# Patient Record
Sex: MALE | Race: WHITE | Employment: FULL TIME | URBAN - METROPOLITAN AREA
[De-identification: names, ages, dates, MRNs, and addresses within clinical notes are randomized per-mention and may not be internally consistent; named-entity substitution may affect disease eponyms.]

---

## 2017-04-04 LAB
CHOLESTEROL, TOTAL: 218 MG/DL
CHOLESTEROL/HDL RATIO: 6.2
HDLC SERPL-MCNC: 35 MG/DL (ref 35–70)
LDL CHOLESTEROL CALCULATED: NORMAL MG/DL (ref 0–160)
TRIGL SERPL-MCNC: 615 MG/DL
VLDLC SERPL CALC-MCNC: NORMAL MG/DL

## 2019-04-04 LAB
AVERAGE GLUCOSE: NORMAL
CREATININE: 0.7 MG/DL
HBA1C MFR BLD: 13.3 %
POTASSIUM (K+): 3.9
PROSTATE SPECIFIC ANTIGEN: 0.6 NG/ML

## 2019-04-23 VITALS
WEIGHT: 210 LBS | DIASTOLIC BLOOD PRESSURE: 82 MMHG | HEIGHT: 70 IN | BODY MASS INDEX: 30.06 KG/M2 | HEART RATE: 76 BPM | SYSTOLIC BLOOD PRESSURE: 122 MMHG | OXYGEN SATURATION: 98 %

## 2019-04-23 RX ORDER — METFORMIN HYDROCHLORIDE 500 MG/1
500 TABLET, EXTENDED RELEASE ORAL 2 TIMES DAILY
COMMUNITY
End: 2020-01-08 | Stop reason: SDUPTHER

## 2019-04-23 RX ORDER — GLIMEPIRIDE 4 MG/1
4 TABLET ORAL 2 TIMES DAILY
COMMUNITY
End: 2020-01-08 | Stop reason: SDUPTHER

## 2019-08-30 ENCOUNTER — APPOINTMENT (OUTPATIENT)
Dept: CT IMAGING | Age: 59
End: 2019-08-30
Payer: COMMERCIAL

## 2019-08-30 ENCOUNTER — HOSPITAL ENCOUNTER (EMERGENCY)
Age: 59
Discharge: HOME | End: 2019-08-30
Payer: COMMERCIAL

## 2019-08-30 ENCOUNTER — APPOINTMENT (OUTPATIENT)
Dept: GENERAL RADIOLOGY | Age: 59
End: 2019-08-30
Payer: COMMERCIAL

## 2019-08-30 VITALS
WEIGHT: 206 LBS | HEART RATE: 75 BPM | SYSTOLIC BLOOD PRESSURE: 113 MMHG | DIASTOLIC BLOOD PRESSURE: 79 MMHG | OXYGEN SATURATION: 96 % | TEMPERATURE: 97.6 F | BODY MASS INDEX: 29.49 KG/M2 | HEIGHT: 70 IN | RESPIRATION RATE: 16 BRPM

## 2019-08-30 DIAGNOSIS — S43.401A SPRAIN OF RIGHT SHOULDER, UNSPECIFIED SHOULDER SPRAIN TYPE, INITIAL ENCOUNTER: ICD-10-CM
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
## 2019-08-30 DIAGNOSIS — S13.9XXA CERVICAL SPRAIN, INITIAL ENCOUNTER: ICD-10-CM

## 2019-08-30 PROCEDURE — 73030 X-RAY EXAM OF SHOULDER: CPT

## 2019-08-30 PROCEDURE — 70450 CT HEAD/BRAIN W/O DYE: CPT

## 2019-08-30 PROCEDURE — 99284 EMERGENCY DEPT VISIT MOD MDM: CPT

## 2019-08-30 PROCEDURE — 72125 CT NECK SPINE W/O DYE: CPT

## 2019-08-30 PROCEDURE — 73070 X-RAY EXAM OF ELBOW: CPT

## 2019-08-30 PROCEDURE — 6370000000 HC RX 637 (ALT 250 FOR IP): Performed by: PHYSICIAN ASSISTANT

## 2019-08-30 RX ORDER — CYCLOBENZAPRINE HCL 10 MG
10 TABLET ORAL 3 TIMES DAILY PRN
Qty: 21 TABLET | Refills: 0 | Status: SHIPPED | OUTPATIENT
Start: 2019-08-30 | End: 2019-09-09

## 2019-08-30 RX ORDER — ACETAMINOPHEN 500 MG
1000 TABLET ORAL ONCE
Status: COMPLETED | OUTPATIENT
Start: 2019-08-30 | End: 2019-08-30

## 2019-08-30 RX ORDER — NAPROXEN 500 MG/1
500 TABLET ORAL 2 TIMES DAILY
Qty: 30 TABLET | Refills: 0 | Status: SHIPPED | OUTPATIENT
Start: 2019-08-30 | End: 2020-01-08

## 2019-08-30 RX ADMIN — ACETAMINOPHEN 1000 MG: 500 TABLET ORAL at 13:57

## 2019-08-30 ASSESSMENT — PAIN DESCRIPTION - PAIN TYPE: TYPE: ACUTE PAIN

## 2019-08-30 ASSESSMENT — PAIN SCALES - GENERAL: PAINLEVEL_OUTOF10: 5

## 2019-08-30 ASSESSMENT — PAIN DESCRIPTION - DESCRIPTORS: DESCRIPTORS: THROBBING;TINGLING;NUMBNESS

## 2019-08-30 ASSESSMENT — PAIN DESCRIPTION - FREQUENCY: FREQUENCY: CONTINUOUS

## 2019-08-30 ASSESSMENT — PAIN DESCRIPTION - LOCATION: LOCATION: ARM

## 2019-08-30 ASSESSMENT — PAIN DESCRIPTION - ORIENTATION: ORIENTATION: RIGHT

## 2019-08-30 NOTE — ED PROVIDER NOTES
that he does not use drugs. Family History: family history includes Cancer in his father; Diabetes in his mother; Heart Attack in his mother; Stroke in his mother. The patients home medications have been reviewed. Allergies: Patient has no known allergies. --------------------------------- RESULTS ------------------------------------------  All laboratory and radiology results have been personally reviewed by myself   LABS:  No results found for this visit on 08/30/19. RADIOLOGY:  Interpreted by Radiologist.  [place] Lupillo Contrast   Final Result      No evidence of acute intracranial hemorrhage or edema. CT Cervical Spine WO Contrast   Final Result   No evidence of fracture or dislocation of cervical spine. XR SHOULDER RIGHT (MIN 2 VIEWS)   Final Result      No evidence of fracture or dislocation of the shoulder. XR ELBOW RIGHT (2 VIEWS)   Final Result   No fracture or dislocation.                      ----------------- NURSING NOTES AND VITALS REVIEWED ---------------   The nursing notes within the ED encounter and vital signs as below have been reviewed. /79   Pulse 75   Temp 97.6 °F (36.4 °C)   Resp 16   Ht 5' 10\" (1.778 m)   Wt 206 lb (93.4 kg)   SpO2 96%   BMI 29.56 kg/m²   Oxygen Saturation Interpretation: Normal      --------------------------------PHYSICAL EXAM------------------------------------      Constitutional/General: Alert and oriented x3, mild distress  Head: NC/AT  Eyes: PERRL, EOMI  Mouth: Oropharynx clear, handling secretions, no trismus  Neck: Supple, no obvious trauma, swelling or erythema. Pt has mild midline tenderness but diffuse. Nonfocal  Pulmonary: Lungs clear to auscultation bilaterally, no wheezes, rales, or rhonchi. Not in respiratory distress  Cardiovascular:  Regular rate and rhythm, no murmurs, gallops, or rubs. 2+ distal pulses  Extremities: Moves all extremities x 4.   Exam of

## 2020-01-07 ASSESSMENT — ENCOUNTER SYMPTOMS
ABDOMINAL PAIN: 0
BLOOD IN STOOL: 0
CHEST TIGHTNESS: 0
SHORTNESS OF BREATH: 0

## 2020-01-07 NOTE — PROGRESS NOTES
20  [facility] : 1960 Sex: male  Age: 63 y.o. Chief Complaint   Patient presents with    Congestion    Cough     moist productive            Patient presents for recheck of diabetes, congestion and cough. Patient has a productive cough, yellow in color. Fever on and off. Present for over 2-3 weeks. Patient states he was on vacation and had stopped his medications for diabetes and has not restarted them. Stated he would wait till he talked to the doctor to see if he wanted him back on them. Last a1c nine months ago was 13.3. Will do a1c inhouse today---13.5. Patient states he went to diabetic teaching years ago. Counseled on reducing carbs and sugars. HPI: Denies chest pain, edema, fatigue, palpitations and syncope. Compliance reviewed. No medication side effects noted. Review of Systems   Constitutional: Negative for appetite change and unexpected weight change. HENT: Positive for congestion, sinus pressure and sinus pain. Negative for ear pain. Eyes: Negative for visual disturbance. Respiratory: Positive for cough. Negative for chest tightness and shortness of breath. Cardiovascular: Negative for chest pain and leg swelling. Gastrointestinal: Negative for abdominal pain and blood in stool. Endocrine: Negative for cold intolerance and heat intolerance. Genitourinary: Positive for frequency. Negative for difficulty urinating. Musculoskeletal: Negative for arthralgias. Skin: Negative for rash. Neurological: Negative for dizziness, light-headedness and headaches. Hematological: Negative for adenopathy. Psychiatric/Behavioral: Negative for suicidal ideas. The patient is not nervous/anxious.           Current Outpatient Medications:     metFORMIN (GLUCOPHAGE-XR) 500 MG extended release tablet, Take 2 tablets in the am and 2 in the pm, Disp: 360 tablet, Rfl: 1    glimepiride (AMARYL) 4 MG tablet, Take 1 tablet with breakfast and 1 with supper, Disp: 180 tablet, Rfl: 1    azithromycin (ZITHROMAX) 250 MG tablet, Take 2 pills today and then one daily thereafter, Disp: 6 tablet, Rfl: 1  No Known Allergies    Past Medical History:   Diagnosis Date    Diverticulitis      No past surgical history on file.   Family History   Problem Relation Age of Onset    Stroke Mother     Diabetes Mother     Heart Attack Mother     Cancer Father         leukemia     Social History     Socioeconomic History    Marital status: Single     Spouse name: Not on file    Number of children: Not on file    Years of education: Not on file    Highest education level: Not on file   Occupational History    Not on file   Social Needs    Financial resource strain: Not on file    Food insecurity:     Worry: Not on file     Inability: Not on file    Transportation needs:     Medical: Not on file     Non-medical: Not on file   Tobacco Use    Smoking status: Current Every Day Smoker     Packs/day: 0.50     Years: 43.00     Pack years: 21.50     Start date: 1977    Smokeless tobacco: Never Used   Substance and Sexual Activity    Alcohol use: Not Currently    Drug use: Never    Sexual activity: Not on file   Lifestyle    Physical activity:     Days per week: Not on file     Minutes per session: Not on file    Stress: Not on file   Relationships    Social connections:     Talks on phone: Not on file     Gets together: Not on file     Attends Cheondoism service: Not on file     Active member of club or organization: Not on file     Attends meetings of clubs or organizations: Not on file     Relationship status: Not on file    Intimate partner violence:     Fear of current or ex partner: Not on file     Emotionally abused: Not on file     Physically abused: Not on file     Forced sexual activity: Not on file   Other Topics Concern    Not on file   Social History Narrative    Not on file       Vitals:    01/08/20 0910 01/08/20 0929   BP: 134/80 126/74   Pulse: 82    Resp: 16    Temp: 97.9 carbs and sugars.  -restart both Glimepiride and Metformin  -foot exam performed  Orders:  -     POCT glycosylated hemoglobin (Hb A1C)  -      DIABETES FOOT EXAM  -     Comprehensive Metabolic Panel; Future  -     Hemoglobin A1C; Future  -     metFORMIN (GLUCOPHAGE-XR) 500 MG extended release tablet; Take 2 tablets in the am and 2 in the pm  -     glimepiride (AMARYL) 4 MG tablet; Take 1 tablet with breakfast and 1 with supper    Class 1 obesity due to excess calories without serious comorbidity with body mass index (BMI) of 30.0 to 30.9 in adult  Comments:  -reduce carbs, and sugars. Mixed hyperlipidemia  -     Lipid Panel; Future            Return in about 1 month (around 2/8/2020). Seen By:  Bobby Ortiz, DO        This note has been transcribed by Alex Hendricks under the direction of Dr. Kellen Almeida. Dr. Arsenio Morgan has reviewed this note for accuracy. I, Dr. Arsenio Morgan, personally performed the services described in this documentation as scribed by Alex Boards in my presence, and it is both accurate and complete.

## 2020-01-08 ENCOUNTER — HOSPITAL ENCOUNTER (OUTPATIENT)
Age: 60
Discharge: HOME | End: 2020-01-10
Payer: COMMERCIAL

## 2020-01-08 ENCOUNTER — OFFICE VISIT (OUTPATIENT)
Dept: FAMILY MEDICINE CLINIC | Age: 60
End: 2020-01-08
Payer: COMMERCIAL

## 2020-01-08 VITALS
TEMPERATURE: 97.9 F | BODY MASS INDEX: 29.29 KG/M2 | WEIGHT: 204.6 LBS | DIASTOLIC BLOOD PRESSURE: 74 MMHG | RESPIRATION RATE: 16 BRPM | HEIGHT: 70 IN | SYSTOLIC BLOOD PRESSURE: 126 MMHG | OXYGEN SATURATION: 97 % | HEART RATE: 82 BPM

## 2020-01-08 LAB
ALBUMIN SERPL-MCNC: 4.3 G/DL (ref 3.5–5.2)
ALP BLD-CCNC: 90 U/L (ref 40–129)
ALT SERPL-CCNC: 29 U/L (ref 0–40)
ANION GAP SERPL CALCULATED.3IONS-SCNC: 18 MMOL/L (ref 7–16)
AST SERPL-CCNC: 24 U/L (ref 0–39)
BILIRUB SERPL-MCNC: 0.4 MG/DL (ref 0–1.2)
BUN BLDV-MCNC: 15 MG/DL (ref 8–23)
CALCIUM SERPL-MCNC: 9.5 MG/DL (ref 8.6–10.2)
CHLORIDE BLD-SCNC: 94 MMOL/L (ref 98–107)
CHOLESTEROL, TOTAL: 223 MG/DL (ref 0–199)
CO2: 22 MMOL/L (ref 22–29)
CREAT SERPL-MCNC: 0.6 MG/DL (ref 0.7–1.2)
GFR AFRICAN AMERICAN: >60
GFR NON-AFRICAN AMERICAN: >60 ML/MIN/1.73
GLUCOSE BLD-MCNC: 382 MG/DL (ref 74–99)
HBA1C MFR BLD: 13.5 %
HDLC SERPL-MCNC: 36 MG/DL
LDL CHOLESTEROL CALCULATED: ABNORMAL MG/DL (ref 0–99)
POTASSIUM SERPL-SCNC: 4.3 MMOL/L (ref 3.5–5)
SODIUM BLD-SCNC: 134 MMOL/L (ref 132–146)
TOTAL PROTEIN: 6.9 G/DL (ref 6.4–8.3)
TRIGL SERPL-MCNC: 557 MG/DL (ref 0–149)
VLDLC SERPL CALC-MCNC: ABNORMAL MG/DL

## 2020-01-08 PROCEDURE — 3017F COLORECTAL CA SCREEN DOC REV: CPT | Performed by: FAMILY MEDICINE

## 2020-01-08 PROCEDURE — 99214 OFFICE O/P EST MOD 30 MIN: CPT | Performed by: FAMILY MEDICINE

## 2020-01-08 PROCEDURE — G8427 DOCREV CUR MEDS BY ELIG CLIN: HCPCS | Performed by: FAMILY MEDICINE

## 2020-01-08 PROCEDURE — G8419 CALC BMI OUT NRM PARAM NOF/U: HCPCS | Performed by: FAMILY MEDICINE

## 2020-01-08 PROCEDURE — 3046F HEMOGLOBIN A1C LEVEL >9.0%: CPT | Performed by: FAMILY MEDICINE

## 2020-01-08 PROCEDURE — 80053 COMPREHEN METABOLIC PANEL: CPT

## 2020-01-08 PROCEDURE — 4004F PT TOBACCO SCREEN RCVD TLK: CPT | Performed by: FAMILY MEDICINE

## 2020-01-08 PROCEDURE — 80061 LIPID PANEL: CPT

## 2020-01-08 PROCEDURE — 36415 COLL VENOUS BLD VENIPUNCTURE: CPT

## 2020-01-08 PROCEDURE — G8484 FLU IMMUNIZE NO ADMIN: HCPCS | Performed by: FAMILY MEDICINE

## 2020-01-08 PROCEDURE — 2022F DILAT RTA XM EVC RTNOPTHY: CPT | Performed by: FAMILY MEDICINE

## 2020-01-08 PROCEDURE — 83036 HEMOGLOBIN GLYCOSYLATED A1C: CPT | Performed by: FAMILY MEDICINE

## 2020-01-08 RX ORDER — GLIMEPIRIDE 4 MG/1
TABLET ORAL
Qty: 180 TABLET | Refills: 1 | Status: SHIPPED
Start: 2020-01-08 | End: 2020-03-31 | Stop reason: SDUPTHER

## 2020-01-08 RX ORDER — METFORMIN HYDROCHLORIDE 500 MG/1
TABLET, EXTENDED RELEASE ORAL
Qty: 360 TABLET | Refills: 1 | Status: SHIPPED
Start: 2020-01-08 | End: 2020-03-31 | Stop reason: SDUPTHER

## 2020-01-08 RX ORDER — AZITHROMYCIN 250 MG/1
TABLET, FILM COATED ORAL
Qty: 6 TABLET | Refills: 1 | Status: SHIPPED
Start: 2020-01-08 | End: 2020-02-11 | Stop reason: ALTCHOICE

## 2020-01-08 ASSESSMENT — PATIENT HEALTH QUESTIONNAIRE - PHQ9
1. LITTLE INTEREST OR PLEASURE IN DOING THINGS: 0
SUM OF ALL RESPONSES TO PHQ9 QUESTIONS 1 & 2: 0
SUM OF ALL RESPONSES TO PHQ QUESTIONS 1-9: 0
2. FEELING DOWN, DEPRESSED OR HOPELESS: 0
SUM OF ALL RESPONSES TO PHQ QUESTIONS 1-9: 0

## 2020-01-08 ASSESSMENT — ENCOUNTER SYMPTOMS
COUGH: 1
SINUS PRESSURE: 1
SINUS PAIN: 1

## 2020-01-09 ENCOUNTER — TELEPHONE (OUTPATIENT)
Dept: FAMILY MEDICINE CLINIC | Age: 60
End: 2020-01-09

## 2020-01-09 RX ORDER — FENOFIBRATE 160 MG/1
160 TABLET ORAL DAILY
Qty: 90 TABLET | Refills: 1 | Status: SHIPPED
Start: 2020-01-09 | End: 2020-03-31 | Stop reason: SDUPTHER

## 2020-02-10 ASSESSMENT — ENCOUNTER SYMPTOMS
CHEST TIGHTNESS: 0
ABDOMINAL PAIN: 0
SHORTNESS OF BREATH: 0
BLOOD IN STOOL: 0

## 2020-02-10 NOTE — PROGRESS NOTES
2/10/20  Yanet Mayorga : 1960 Sex: male  Age: 63 y.o. Chief Complaint   Patient presents with    Hyperlipidemia    Diabetes       HPI:  Patient here for one month follow up on bronchitis and diabetes. Patient is here with his wife today. They are working on patient's diet. Will wait to recheck A1C in 90 days. Patient fell in Aug and hurt his right shoulder, patient continues to have arm pain and hand numbness. He has bought a [employer] unit and it has been helping. Denies chest pain, edema, fatigue, palpitations and syncope. Compliance reviewed. No medication side effects noted. Review of Systems   Constitutional: Negative for appetite change and unexpected weight change. HENT: Negative for congestion and ear pain. Eyes: Negative for visual disturbance. Respiratory: Negative for chest tightness and shortness of breath. Cardiovascular: Negative for chest pain and leg swelling. Gastrointestinal: Negative for abdominal pain and blood in stool. Endocrine: Negative for cold intolerance and heat intolerance. Genitourinary: Negative for difficulty urinating. Musculoskeletal: Negative for arthralgias. Right shoulder pain   Skin: Negative for rash. Neurological: Negative for dizziness and light-headedness. Hematological: Negative for adenopathy. Psychiatric/Behavioral: Negative for suicidal ideas. The patient is not nervous/anxious. Current Outpatient Medications:     fenofibrate 160 MG tablet, Take 1 tablet by mouth daily, Disp: 90 tablet, Rfl: 1    metFORMIN (GLUCOPHAGE-XR) 500 MG extended release tablet, Take 2 tablets in the am and 2 in the pm, Disp: 360 tablet, Rfl: 1    glimepiride (AMARYL) 4 MG tablet, Take 1 tablet with breakfast and 1 with supper, Disp: 180 tablet, Rfl: 1  No Known Allergies    Past Medical History:   Diagnosis Date    Diverticulitis      No past surgical history on file.   Family History   Problem Relation Age of Onset    Stroke Mother     Diabetes Mother     Heart Attack Mother     Cancer Father         leukemia     Social History     Socioeconomic History    Marital status: Single     Spouse name: Not on file    Number of children: Not on file    Years of education: Not on file    Highest education level: Not on file   Occupational History    Not on file   Social Needs    Financial resource strain: Not on file    Food insecurity:     Worry: Not on file     Inability: Not on file    Transportation needs:     Medical: Not on file     Non-medical: Not on file   Tobacco Use    Smoking status: Current Every Day Smoker     Packs/day: 0.50     Years: 43.00     Pack years: 21.50     Start date: 1977    Smokeless tobacco: Never Used   Substance and Sexual Activity    Alcohol use: Not Currently    Drug use: Never    Sexual activity: Not on file   Lifestyle    Physical activity:     Days per week: Not on file     Minutes per session: Not on file    Stress: Not on file   Relationships    Social connections:     Talks on phone: Not on file     Gets together: Not on file     Attends Christianity service: Not on file     Active member of club or organization: Not on file     Attends meetings of clubs or organizations: Not on file     Relationship status: Not on file    Intimate partner violence:     Fear of current or ex partner: Not on file     Emotionally abused: Not on file     Physically abused: Not on file     Forced sexual activity: Not on file   Other Topics Concern    Not on file   Social History Narrative    Not on file       Vitals:    02/11/20 0843 02/11/20 0901   BP: 122/70 132/72   Pulse: 70    Resp: 16    Temp: 97.8 °F (36.6 °C)    TempSrc: Temporal    SpO2: 98%    Weight: 214 lb 3.2 oz (97.2 kg)                Physical Exam  Constitutional:       Appearance: Normal appearance. He is well-developed. HENT:      Head: Normocephalic. Eyes:      Extraocular Movements: Extraocular movements intact.       Pupils: Pupils are equal, round, and reactive to light. Neck:      Musculoskeletal: Normal range of motion and neck supple. Thyroid: No thyromegaly. Cardiovascular:      Rate and Rhythm: Normal rate and regular rhythm. Heart sounds: Normal heart sounds. Pulmonary:      Effort: Pulmonary effort is normal.      Breath sounds: Normal breath sounds. Abdominal:      General: Bowel sounds are normal.      Palpations: Abdomen is soft. There is no mass. Tenderness: There is no abdominal tenderness. Musculoskeletal: Normal range of motion. Lymphadenopathy:      Cervical: No cervical adenopathy. Skin:     General: Skin is warm and dry. Findings: No rash. Neurological:      Mental Status: He is alert and oriented to person, place, and time. Cranial Nerves: No cranial nerve deficit. Psychiatric:         Mood and Affect: Mood normal.         Behavior: Behavior normal.               Assessment and Plan:        Return in about 8 weeks (around 4/9/2020). Seen By:  Bobby Ortiz DO        This note has been transcribed by Alex Hendricks under the direction of Dr. Kellen Almeida. I, Dr. Arsenio Morgan, personally performed the services described in this documentation as scribed by Alex Hendricks in my presence, and it is both accurate and complete.

## 2020-02-11 ENCOUNTER — OFFICE VISIT (OUTPATIENT)
Dept: FAMILY MEDICINE CLINIC | Age: 60
End: 2020-02-11
Payer: COMMERCIAL

## 2020-02-11 VITALS
SYSTOLIC BLOOD PRESSURE: 132 MMHG | BODY MASS INDEX: 30.73 KG/M2 | RESPIRATION RATE: 16 BRPM | HEART RATE: 70 BPM | WEIGHT: 214.2 LBS | DIASTOLIC BLOOD PRESSURE: 72 MMHG | OXYGEN SATURATION: 98 % | TEMPERATURE: 97.8 F

## 2020-02-11 PROBLEM — E78.2 MIXED HYPERLIPIDEMIA: Status: ACTIVE | Noted: 2020-02-11

## 2020-02-11 PROBLEM — E11.9 DIABETES MELLITUS (HCC): Status: ACTIVE | Noted: 2020-02-11

## 2020-02-11 PROCEDURE — 3046F HEMOGLOBIN A1C LEVEL >9.0%: CPT | Performed by: FAMILY MEDICINE

## 2020-02-11 PROCEDURE — 99214 OFFICE O/P EST MOD 30 MIN: CPT | Performed by: FAMILY MEDICINE

## 2020-02-11 PROCEDURE — 4004F PT TOBACCO SCREEN RCVD TLK: CPT | Performed by: FAMILY MEDICINE

## 2020-02-11 PROCEDURE — G8484 FLU IMMUNIZE NO ADMIN: HCPCS | Performed by: FAMILY MEDICINE

## 2020-02-11 PROCEDURE — G8417 CALC BMI ABV UP PARAM F/U: HCPCS | Performed by: FAMILY MEDICINE

## 2020-02-11 PROCEDURE — 3017F COLORECTAL CA SCREEN DOC REV: CPT | Performed by: FAMILY MEDICINE

## 2020-02-11 PROCEDURE — G8427 DOCREV CUR MEDS BY ELIG CLIN: HCPCS | Performed by: FAMILY MEDICINE

## 2020-02-11 PROCEDURE — 2022F DILAT RTA XM EVC RTNOPTHY: CPT | Performed by: FAMILY MEDICINE

## 2020-03-31 RX ORDER — GLIMEPIRIDE 4 MG/1
TABLET ORAL
Qty: 180 TABLET | Refills: 1 | Status: SHIPPED | OUTPATIENT
Start: 2020-03-31 | End: 2020-11-11 | Stop reason: SDUPTHER

## 2020-03-31 RX ORDER — METFORMIN HYDROCHLORIDE 500 MG/1
TABLET, EXTENDED RELEASE ORAL
Qty: 360 TABLET | Refills: 1 | Status: SHIPPED | OUTPATIENT
Start: 2020-03-31 | End: 2020-11-03 | Stop reason: SDUPTHER

## 2020-03-31 RX ORDER — FENOFIBRATE 160 MG/1
160 TABLET ORAL DAILY
Qty: 90 TABLET | Refills: 1 | Status: SHIPPED | OUTPATIENT
Start: 2020-03-31 | End: 2021-06-28 | Stop reason: ALTCHOICE

## 2020-11-03 RX ORDER — METFORMIN HYDROCHLORIDE 500 MG/1
TABLET, EXTENDED RELEASE ORAL
Qty: 120 TABLET | Refills: 0 | Status: SHIPPED
Start: 2020-11-03 | End: 2020-11-25 | Stop reason: SDUPTHER

## 2020-11-11 ENCOUNTER — OFFICE VISIT (OUTPATIENT)
Dept: PRIMARY CARE CLINIC | Age: 60
End: 2020-11-11
Payer: COMMERCIAL

## 2020-11-11 VITALS
BODY MASS INDEX: 30.78 KG/M2 | WEIGHT: 215 LBS | TEMPERATURE: 97.7 F | HEART RATE: 74 BPM | SYSTOLIC BLOOD PRESSURE: 124 MMHG | HEIGHT: 70 IN | OXYGEN SATURATION: 97 % | RESPIRATION RATE: 20 BRPM | DIASTOLIC BLOOD PRESSURE: 74 MMHG

## 2020-11-11 PROBLEM — N52.9 ERECTILE DYSFUNCTION: Status: ACTIVE | Noted: 2020-11-11

## 2020-11-11 PROBLEM — M67.911 BILATERAL ROTATOR CUFF DYSFUNCTION: Status: ACTIVE | Noted: 2020-11-11

## 2020-11-11 PROBLEM — M67.912 BILATERAL ROTATOR CUFF DYSFUNCTION: Status: ACTIVE | Noted: 2020-11-11

## 2020-11-11 PROBLEM — M65.30 TRIGGER FINGER, UNSPECIFIED FINGER: Status: ACTIVE | Noted: 2020-11-11

## 2020-11-11 PROCEDURE — 90471 IMMUNIZATION ADMIN: CPT | Performed by: FAMILY MEDICINE

## 2020-11-11 PROCEDURE — G8417 CALC BMI ABV UP PARAM F/U: HCPCS | Performed by: FAMILY MEDICINE

## 2020-11-11 PROCEDURE — 4004F PT TOBACCO SCREEN RCVD TLK: CPT | Performed by: FAMILY MEDICINE

## 2020-11-11 PROCEDURE — 3017F COLORECTAL CA SCREEN DOC REV: CPT | Performed by: FAMILY MEDICINE

## 2020-11-11 PROCEDURE — 99214 OFFICE O/P EST MOD 30 MIN: CPT | Performed by: FAMILY MEDICINE

## 2020-11-11 PROCEDURE — 90715 TDAP VACCINE 7 YRS/> IM: CPT | Performed by: FAMILY MEDICINE

## 2020-11-11 PROCEDURE — G8427 DOCREV CUR MEDS BY ELIG CLIN: HCPCS | Performed by: FAMILY MEDICINE

## 2020-11-11 PROCEDURE — 3046F HEMOGLOBIN A1C LEVEL >9.0%: CPT | Performed by: FAMILY MEDICINE

## 2020-11-11 PROCEDURE — 2022F DILAT RTA XM EVC RTNOPTHY: CPT | Performed by: FAMILY MEDICINE

## 2020-11-11 PROCEDURE — G8484 FLU IMMUNIZE NO ADMIN: HCPCS | Performed by: FAMILY MEDICINE

## 2020-11-11 RX ORDER — GLIMEPIRIDE 4 MG/1
TABLET ORAL
Qty: 180 TABLET | Refills: 1 | Status: SHIPPED
Start: 2020-11-11 | End: 2020-12-04 | Stop reason: SDUPTHER

## 2020-11-11 ASSESSMENT — ENCOUNTER SYMPTOMS
EYE REDNESS: 0
VOMITING: 0
CONSTIPATION: 0
COUGH: 0
SHORTNESS OF BREATH: 0
BLOOD IN STOOL: 0
RHINORRHEA: 0
ABDOMINAL PAIN: 0
DIARRHEA: 0
WHEEZING: 0
NAUSEA: 0
PHOTOPHOBIA: 0
SORE THROAT: 0

## 2020-11-11 NOTE — PROGRESS NOTES
2020    Chief Complaint   Patient presents with   Barry Anna      HPI  Kelsy Turner (:  1960) is a 63 y.o. male, here for evaluation of the following medical concerns:    Patient is a 59-year-old male who has a past medical history of type II diabetes who presents today to establish care and myself. Finger issues: Finger lock up at times. Patient reports that he feels a popping sensation and has to manually extend his fingers at times. Patient reports this is along his 3rd and 4th digits bilaterally. No issues currently. Neck/Shoulder pain issues: Not currently an issue. Patient reports that he has difficulty raising his arm over his head when his shoulder and neck act up. History of uncontrolled type II diabetes: Patient is due for repeat upper. Patient will need diabetic yearly eye and foot exams. In the past is last hemoglobin A1c was 13%. Positive for bilateral upper and lower extremity neuropathy per report. This is new or and started within the last month or so. History of hyperlipidemia: Patient is on Triglide 160 mg daily. Erectile dysfunction: Patient reports that this is been slowly getting worse over the last several years. Requesting management. Health maintenance: Patient declined flu shot. Patient is interested in getting a tetanus booster today. Patient will need to get a shingles at a local pharmacy. She'll get a pneumonia shot his next office 1. Review of Systems   Constitutional: Negative for chills, fatigue and fever. HENT: Negative for congestion, hearing loss, postnasal drip, rhinorrhea, sneezing, sore throat and tinnitus. Eyes: Negative for photophobia and redness. Respiratory: Negative for cough, shortness of breath and wheezing. Cardiovascular: Negative for chest pain, palpitations and leg swelling. Gastrointestinal: Negative for abdominal pain, blood in stool, constipation, diarrhea, nausea and vomiting.    Endocrine: Positive for polyuria. Negative for polydipsia. Genitourinary: Positive for frequency. Negative for difficulty urinating, dysuria, hematuria and testicular pain. Musculoskeletal: Positive for arthralgias. Negative for myalgias. Skin: Negative for rash. Neurological: Positive for numbness and headaches. Negative for dizziness, syncope, weakness and light-headedness. Psychiatric/Behavioral: The patient is not nervous/anxious. Prior to Visit Medications    Medication Sig Taking? Authorizing Provider   glimepiride (AMARYL) 4 MG tablet Take 1 tablet with breakfast and 1 with supper Yes Ling Arenas MD   metFORMIN (GLUCOPHAGE-XR) 500 MG extended release tablet Take 2 tablets in the am and 2 in the pm Yes Kishor Lloyd PA-C   fenofibrate (TRIGLIDE) 160 MG tablet Take 1 tablet by mouth daily Yes Jesse Swann DO        No Known Allergies    Past Medical History:   Diagnosis Date    Diabetes mellitus type 2 in obese ([address]     Diverticulitis     Hyperlipidemia         History reviewed. No pertinent surgical history.     Family History   Problem Relation Age of Onset   [facility] Stroke Mother     Diabetes Mother     Heart Attack Mother     Cancer Father         leukemia    Cancer Sister         leukemia       Immunization History   Administered Date(s) Administered    Influenza Vaccine, unspecified formulation 10/15/2014    Influenza Virus Vaccine 10/15/2014    Tdap (Boostrix, Adacel) 11/11/2020       Health Maintenance   Topic Date Due    Pneumococcal 0-64 years Vaccine (1 of 1 - PPSV23) 01/01/1965    Diabetic retinal exam  01/01/1969    Diabetic microalbuminuria test  01/01/1977    Shingles Vaccine (1 of 2) 01/01/2009    A1C test (Diabetic or Prediabetic)  04/08/2020    Flu vaccine (1) 11/11/2021 (Originally 9/1/2020)    Diabetic foot exam  01/08/2021    Lipid screen  01/08/2021    Colon cancer screen colonoscopy  09/17/2024    DTaP/Tdap/Td vaccine (2 - Td) 11/11/2030    Hepatitis A vaccine Aged Out    Hib vaccine  Aged Out    Meningococcal (ACWY) vaccine  Aged Out    Hepatitis C screen  Discontinued    HIV screen  Discontinued       Social History     Socioeconomic History    Marital status: Single     Spouse name: Not on file    Number of children: Not on file    Years of education: Not on file    Highest education level: Not on file   Occupational History    Not on file   Social Needs    Financial resource strain: Not on file    Food insecurity     Worry: Not on file     Inability: Not on file   [employer] needs     Medical: Not on file     Non-medical: Not on file   Tobacco Use    Smoking status: Current Every Day Smoker     Packs/day: 0.50     Years: 43.00     Pack years: 21.50     Start date: 1977    Smokeless tobacco: Never Used   Substance and Sexual Activity    Alcohol use: Not Currently    Drug use: Never    Sexual activity: Not on file   Lifestyle    Physical activity     Days per week: Not on file     Minutes per session: Not on file    Stress: Not on file   Relationships    Social connections     Talks on phone: Not on file     Gets together: Not on file     Attends Mosque service: Not on file     Active member of club or organization: Not on file     Attends meetings of clubs or organizations: Not on file     Relationship status: Not on file    Intimate partner violence     Fear of current or ex partner: Not on file     Emotionally abused: Not on file     Physically abused: Not on file     Forced sexual activity: Not on file   Other Topics Concern    Not on file   Social History Narrative    Not on file           Vitals:    11/11/20 1350   BP: 124/74   Pulse: 74   Resp: 20   Temp: 97.7 °F (36.5 °C)   TempSrc: Temporal   SpO2: 97%   Weight: 215 lb (97.5 kg)   Height: 5' 10\" (1.778 m)       Estimated body mass index is 30.85 kg/m² as calculated from the following:    Height as of this encounter: 5' 10\" (1.778 m).     Weight as of this encounter: 215 lb (97.5 kg).     Physical Exam  Vitals signs and nursing note reviewed. Constitutional:       Appearance: He is well-developed. HENT:      Head: Normocephalic. Right Ear: Tympanic membrane and external ear normal.      Left Ear: Tympanic membrane and external ear normal.   Eyes:      Extraocular Movements: Extraocular movements intact. Conjunctiva/sclera: Conjunctivae normal.      Pupils: Pupils are equal, round, and reactive to light. Cardiovascular:      Rate and Rhythm: Normal rate and regular rhythm. Pulses:           Radial pulses are 2+ on the right side and 2+ on the left side. Posterior tibial pulses are 2+ on the right side and 2+ on the left side. Heart sounds: Normal heart sounds. No murmur. Pulmonary:      Effort: Pulmonary effort is normal. No respiratory distress. Breath sounds: Normal breath sounds. No decreased breath sounds, wheezing or rales. Abdominal:      General: Bowel sounds are normal. There is no distension. Palpations: Abdomen is soft. Tenderness: There is no abdominal tenderness. There is no rebound. Musculoskeletal: Normal range of motion. Right lower leg: No edema. Left lower leg: No edema. Skin:     General: Skin is warm and dry. Findings: No rash. Neurological:      Mental Status: He is alert and oriented to person, place, and time. Cranial Nerves: No cranial nerve deficit. Sensory: No sensory deficit. Deep Tendon Reflexes:      Reflex Scores:       Patellar reflexes are 2+ on the right side and 2+ on the left side. Psychiatric:         Behavior: Behavior normal.               ASSESSMENT/PLAN:  Alejanrdo Sanchez was seen today for established new doctor. Diagnoses and all orders for this visit:    Type 2 diabetes mellitus with other specified complication, without long-term current use of insulin ([facility] [address]  Comments:  Uncontrolled hx. Needs yearly DM eye and foot exams.    Orders:  -     CBC Auto Differential; Future  -     Comprehensive Metabolic Panel; Future  -     Hemoglobin A1C; Future  -     Microalbumin / Creatinine Urine Ratio; Future  -     glimepiride (AMARYL) 4 MG tablet; Take 1 tablet with breakfast and 1 with supper  -     Tdap (age 6y and older) IM (BOOSTRIX)  -     Testosterone, free, total; Future    Mixed hyperlipidemia  -     Lipid Panel; Future  -     TSH without Reflex; Future    Establishing care with new doctor, encounter for  -     CBC Auto Differential; Future  -     Comprehensive Metabolic Panel; Future  -     Hemoglobin A1C; Future  -     Lipid Panel; Future  -     Microalbumin / Creatinine Urine Ratio; Future  -     TSH without Reflex; Future    Screening PSA (prostate specific antigen)  -     PSA SCREENING; Future    Erectile dysfunction, unspecified erectile dysfunction type  Comments:  Testosterone replacment vs Cialis/Viagra based on labs  Orders:  -     PSA SCREENING; Future  -     Testosterone, free, total; Future    Trigger finger, unspecified finger, unspecified laterality  Comments:  OTC/Conservative mngt. Consider referral to hand surgeon for steroid injxn if needed    Bilateral rotator cuff dysfunction  Comments:  Rotator cuff exercises at home advised. Health Maintenance reviewed - PPSV 23 at next apt. Shingles at pharmacy. Labs, f/u 2 weeks. Review and consider test vs cialis or viagra. Return in about 2 weeks (around 11/25/2020) for To Discuss Labs Results, Follow-up Appointment From Today's Visit.       Educational materials and/or home exercises printed for patient's review and were included in patient instructions on his/her After Visit Summary and given to patient at the end of visit.       Counseled regarding above diagnosis, including possible risks and complications,  especially if left uncontrolled.     Counseled regarding the possible side effects, risks, benefits and alternatives to treatment; patient and/or guardianverbalizes understanding, agrees, feels

## 2020-11-12 DIAGNOSIS — E78.2 MIXED HYPERLIPIDEMIA: ICD-10-CM

## 2020-11-12 DIAGNOSIS — E11.69 TYPE 2 DIABETES MELLITUS WITH OTHER SPECIFIED COMPLICATION, WITHOUT LONG-TERM CURRENT USE OF INSULIN (HCC): ICD-10-CM

## 2020-11-12 DIAGNOSIS — Z12.5 SCREENING PSA (PROSTATE SPECIFIC ANTIGEN): ICD-10-CM

## 2020-11-12 DIAGNOSIS — N52.9 ERECTILE DYSFUNCTION, UNSPECIFIED ERECTILE DYSFUNCTION TYPE: ICD-10-CM

## 2020-11-12 DIAGNOSIS — Z76.89 ESTABLISHING CARE WITH NEW DOCTOR, ENCOUNTER FOR: ICD-10-CM

## 2020-11-12 LAB
ALBUMIN SERPL-MCNC: 4.2 G/DL (ref 3.5–5.2)
ALP BLD-CCNC: 74 U/L (ref 40–129)
ALT SERPL-CCNC: 16 U/L (ref 0–40)
ANION GAP SERPL CALCULATED.3IONS-SCNC: 17 MMOL/L (ref 7–16)
AST SERPL-CCNC: 11 U/L (ref 0–39)
BASOPHILS ABSOLUTE: 0.04 E9/L (ref 0–0.2)
BASOPHILS RELATIVE PERCENT: 0.5 % (ref 0–2)
BILIRUB SERPL-MCNC: 0.3 MG/DL (ref 0–1.2)
BUN BLDV-MCNC: 12 MG/DL (ref 8–23)
CALCIUM SERPL-MCNC: 9.4 MG/DL (ref 8.6–10.2)
CHLORIDE BLD-SCNC: 98 MMOL/L (ref 98–107)
CHOLESTEROL, TOTAL: 160 MG/DL (ref 0–199)
CO2: 20 MMOL/L (ref 22–29)
CREAT SERPL-MCNC: 0.6 MG/DL (ref 0.7–1.2)
CREATININE URINE: 76 MG/DL (ref 40–278)
EOSINOPHILS ABSOLUTE: 0.13 E9/L (ref 0.05–0.5)
EOSINOPHILS RELATIVE PERCENT: 1.8 % (ref 0–6)
GFR AFRICAN AMERICAN: >60
GFR NON-AFRICAN AMERICAN: >60 ML/MIN/1.73
GLUCOSE BLD-MCNC: 338 MG/DL (ref 74–99)
HBA1C MFR BLD: 11.2 % (ref 4–5.6)
HCT VFR BLD CALC: 48.9 % (ref 37–54)
HDLC SERPL-MCNC: 44 MG/DL
HEMOGLOBIN: 16.1 G/DL (ref 12.5–16.5)
IMMATURE GRANULOCYTES #: 0.01 E9/L
IMMATURE GRANULOCYTES %: 0.1 % (ref 0–5)
LDL CHOLESTEROL CALCULATED: 90 MG/DL (ref 0–99)
LYMPHOCYTES ABSOLUTE: 2.05 E9/L (ref 1.5–4)
LYMPHOCYTES RELATIVE PERCENT: 28.1 % (ref 20–42)
MCH RBC QN AUTO: 31.1 PG (ref 26–35)
MCHC RBC AUTO-ENTMCNC: 32.9 % (ref 32–34.5)
MCV RBC AUTO: 94.4 FL (ref 80–99.9)
MICROALBUMIN UR-MCNC: <12 MG/L
MICROALBUMIN/CREAT UR-RTO: ABNORMAL (ref 0–30)
MONOCYTES ABSOLUTE: 0.5 E9/L (ref 0.1–0.95)
MONOCYTES RELATIVE PERCENT: 6.9 % (ref 2–12)
NEUTROPHILS ABSOLUTE: 4.56 E9/L (ref 1.8–7.3)
NEUTROPHILS RELATIVE PERCENT: 62.6 % (ref 43–80)
PDW BLD-RTO: 12.1 FL (ref 11.5–15)
PLATELET # BLD: 240 E9/L (ref 130–450)
PMV BLD AUTO: 10.9 FL (ref 7–12)
POTASSIUM SERPL-SCNC: 4.4 MMOL/L (ref 3.5–5)
PROSTATE SPECIFIC ANTIGEN: 0.57 NG/ML (ref 0–4)
RBC # BLD: 5.18 E12/L (ref 3.8–5.8)
SODIUM BLD-SCNC: 135 MMOL/L (ref 132–146)
TOTAL PROTEIN: 6.8 G/DL (ref 6.4–8.3)
TRIGL SERPL-MCNC: 131 MG/DL (ref 0–149)
TSH SERPL DL<=0.05 MIU/L-ACNC: 1.51 UIU/ML (ref 0.27–4.2)
VLDLC SERPL CALC-MCNC: 26 MG/DL
WBC # BLD: 7.3 E9/L (ref 4.5–11.5)

## 2020-11-15 LAB
SEX HORMONE BINDING GLOBULIN: 57 NMOL/L (ref 11–80)
TESTOSTERONE FREE-NONMALE: 94.8 PG/ML (ref 47–244)
TESTOSTERONE TOTAL: 629 NG/DL (ref 220–1000)

## 2020-11-25 ENCOUNTER — OFFICE VISIT (OUTPATIENT)
Dept: PRIMARY CARE CLINIC | Age: 60
End: 2020-11-25
Payer: COMMERCIAL

## 2020-11-25 VITALS
OXYGEN SATURATION: 97 % | RESPIRATION RATE: 16 BRPM | HEART RATE: 75 BPM | BODY MASS INDEX: 30.35 KG/M2 | DIASTOLIC BLOOD PRESSURE: 82 MMHG | HEIGHT: 70 IN | SYSTOLIC BLOOD PRESSURE: 130 MMHG | TEMPERATURE: 97.5 F | WEIGHT: 212 LBS

## 2020-11-25 PROCEDURE — 3017F COLORECTAL CA SCREEN DOC REV: CPT | Performed by: FAMILY MEDICINE

## 2020-11-25 PROCEDURE — 2022F DILAT RTA XM EVC RTNOPTHY: CPT | Performed by: FAMILY MEDICINE

## 2020-11-25 PROCEDURE — G8427 DOCREV CUR MEDS BY ELIG CLIN: HCPCS | Performed by: FAMILY MEDICINE

## 2020-11-25 PROCEDURE — 3046F HEMOGLOBIN A1C LEVEL >9.0%: CPT | Performed by: FAMILY MEDICINE

## 2020-11-25 PROCEDURE — 4004F PT TOBACCO SCREEN RCVD TLK: CPT | Performed by: FAMILY MEDICINE

## 2020-11-25 PROCEDURE — G8417 CALC BMI ABV UP PARAM F/U: HCPCS | Performed by: FAMILY MEDICINE

## 2020-11-25 PROCEDURE — G8484 FLU IMMUNIZE NO ADMIN: HCPCS | Performed by: FAMILY MEDICINE

## 2020-11-25 PROCEDURE — 99214 OFFICE O/P EST MOD 30 MIN: CPT | Performed by: FAMILY MEDICINE

## 2020-11-25 RX ORDER — SILDENAFIL 50 MG/1
50 TABLET, FILM COATED ORAL DAILY PRN
Qty: 15 TABLET | Refills: 0 | Status: SHIPPED
Start: 2020-11-25 | End: 2021-06-14

## 2020-11-25 RX ORDER — DULAGLUTIDE 0.75 MG/.5ML
0.75 INJECTION, SOLUTION SUBCUTANEOUS
Qty: 4 PEN | Refills: 2 | Status: SHIPPED
Start: 2020-11-25 | End: 2020-12-28 | Stop reason: ALTCHOICE

## 2020-11-25 RX ORDER — METFORMIN HYDROCHLORIDE 500 MG/1
TABLET, EXTENDED RELEASE ORAL
Qty: 120 TABLET | Refills: 3 | Status: SHIPPED
Start: 2020-11-25 | End: 2021-01-18 | Stop reason: SDUPTHER

## 2020-11-25 ASSESSMENT — ENCOUNTER SYMPTOMS
RHINORRHEA: 0
DIARRHEA: 0
VOMITING: 0
NAUSEA: 0
SHORTNESS OF BREATH: 0
WHEEZING: 0
SORE THROAT: 0
ABDOMINAL PAIN: 0
CONSTIPATION: 0

## 2020-11-25 NOTE — PROGRESS NOTES
2020     Chief Complaint   Patient presents with    Diabetes     follow up    Results       HPI  Damian Wilder (:  1960) is a 63 y.o. male, here for evaluation of the following medical concerns:    Patient is a 25-year-old male with a past medical history of type 2 diabetes uncontrolled, hyperlipidemia, erectile dysfunction who presents today for follow-up of his establishment office appointment and review of labs. CBC within normal limits. CMP within normal limits besides low bicarb, elevated anion gap, glucose of 338. Hemoglobin A1c 11.2%. Total cholesterol 160, triglycerides 131, HDL 44, LDL 90. PSA 0.57. Testosterone levels normal.  TSH 1.5. Urine microalbumin negative. Review of Systems   Constitutional: Negative for chills and fever. HENT: Negative for congestion, rhinorrhea and sore throat. Respiratory: Negative for shortness of breath and wheezing. Cardiovascular: Negative for chest pain and leg swelling. Gastrointestinal: Negative for abdominal pain, constipation, diarrhea, nausea and vomiting. Skin: Negative for rash. Neurological: Negative for light-headedness and headaches. Past Medical History:   Diagnosis Date    Diabetes mellitus type 2 in obese ([address]     Diverticulitis     Hyperlipidemia        Prior to Visit Medications    Medication Sig Taking?  Authorizing Provider   Dulaglutide (TRULICITY) 0.75 LL/1.3HQ SOPN Inject 0.75 mg into the skin every 7 days Yes Noni Sewell MD   sildenafil (VIAGRA) 50 MG tablet Take 1 tablet by mouth daily as needed for Erectile Dysfunction Yes Noni Sewell MD   metFORMIN (GLUCOPHAGE-XR) 500 MG extended release tablet Take 2 tablets in the am and 2 in the pm Yes Noni Sewell MD   glimepiride (AMARYL) 4 MG tablet Take 1 tablet with breakfast and 1 with supper Yes Noni Sewell MD   fenofibrate (TRIGLIDE) 160 MG tablet Take 1 tablet by mouth daily Yes Keny Ayala,         No Known Allergies    Social History     Tobacco Use    Smoking status: Current Every Day Smoker     Packs/day: 0.50     Years: 43.00     Pack years: 21.50     Start date: 1977    Smokeless tobacco: Never Used   Substance Use Topics    Alcohol use: Not Currently           Vitals:    11/25/20 1331   BP: 130/82   Pulse: 75   Resp: 16   Temp: 97.5 °F (36.4 °C)   TempSrc: Temporal   SpO2: 97%   Weight: 212 lb (96.2 kg)   Height: 5' 10\" (1.778 m)     Estimated body mass index is 30.42 kg/m² as calculated from the following:    Height as of this encounter: 5' 10\" (1.778 m). Weight as of this encounter: 212 lb (96.2 kg). Physical Exam  Constitutional:       Appearance: He is well-developed. HENT:      Head: Normocephalic. Eyes:      Extraocular Movements: Extraocular movements intact. Conjunctiva/sclera: Conjunctivae normal.   Cardiovascular:      Rate and Rhythm: Normal rate and regular rhythm. Heart sounds: Normal heart sounds. No murmur. Pulmonary:      Effort: Pulmonary effort is normal.      Breath sounds: Normal breath sounds. No wheezing or rales. Abdominal:      General: Bowel sounds are normal.      Palpations: Abdomen is soft. Tenderness: There is no abdominal tenderness. Musculoskeletal:      Right lower leg: No edema. Left lower leg: No edema. Neurological:      Mental Status: He is alert. Comments: Cranial nerves grossly intact         ASSESSMENT/PLAN:  Loulou Caruso was seen today for diabetes and results. Diagnoses and all orders for this visit:    Mixed hyperlipidemia  Comments:  Stable. Controlled on Triglide    Type 2 diabetes mellitus with other specified complication, without long-term current use of insulin ([facility])  Comments:  --reduce carbs and sugars.  -Cont Glimepiride and Metformin  - Start Trulicity  -Check AM Fasting Glucose  -Call with med and glucose update 2 weeks  Orders:  -     metFORMIN (GLUCOPHAGE-XR) 500 MG extended release tablet;  Take 2 tablets in the am and 2 in the pm    Erectile dysfunction, unspecified erectile dysfunction type  Comments:  Trial of Viagra, 1/2-1 tab daily PRN  SE reviewed and severe SE that require ER visit. Class 1 obesity due to excess calories with serious comorbidity and body mass index (BMI) of 30.0 to 30.9 in adult  Comments:  Lifestyle modifications needed. Other orders  -     Dulaglutide (TRULICITY) 4.21 HX/1.4IH SOPN; Inject 0.75 mg into the skin every 7 days  -     sildenafil (VIAGRA) 50 MG tablet; Take 1 tablet by mouth daily as needed for Erectile Dysfunction        Return in about 4 weeks (around 12/23/2020). An [employer] was used to authenticate this note.     --Farhat Ramos MD on 11/25/20 at 1:34 PM EST

## 2020-12-04 RX ORDER — GLIMEPIRIDE 4 MG/1
TABLET ORAL
Qty: 180 TABLET | Refills: 1 | Status: SHIPPED
Start: 2020-12-04 | End: 2021-03-18 | Stop reason: SDUPTHER

## 2020-12-28 ENCOUNTER — OFFICE VISIT (OUTPATIENT)
Dept: PRIMARY CARE CLINIC | Age: 60
End: 2020-12-28
Payer: COMMERCIAL

## 2020-12-28 VITALS
HEART RATE: 70 BPM | DIASTOLIC BLOOD PRESSURE: 68 MMHG | SYSTOLIC BLOOD PRESSURE: 114 MMHG | HEIGHT: 70 IN | WEIGHT: 212 LBS | OXYGEN SATURATION: 97 % | TEMPERATURE: 97.5 F | BODY MASS INDEX: 30.35 KG/M2 | RESPIRATION RATE: 16 BRPM

## 2020-12-28 PROCEDURE — 4004F PT TOBACCO SCREEN RCVD TLK: CPT | Performed by: FAMILY MEDICINE

## 2020-12-28 PROCEDURE — G8484 FLU IMMUNIZE NO ADMIN: HCPCS | Performed by: FAMILY MEDICINE

## 2020-12-28 PROCEDURE — 3017F COLORECTAL CA SCREEN DOC REV: CPT | Performed by: FAMILY MEDICINE

## 2020-12-28 PROCEDURE — G8427 DOCREV CUR MEDS BY ELIG CLIN: HCPCS | Performed by: FAMILY MEDICINE

## 2020-12-28 PROCEDURE — G8417 CALC BMI ABV UP PARAM F/U: HCPCS | Performed by: FAMILY MEDICINE

## 2020-12-28 PROCEDURE — 3046F HEMOGLOBIN A1C LEVEL >9.0%: CPT | Performed by: FAMILY MEDICINE

## 2020-12-28 PROCEDURE — 99213 OFFICE O/P EST LOW 20 MIN: CPT | Performed by: FAMILY MEDICINE

## 2020-12-28 PROCEDURE — 2022F DILAT RTA XM EVC RTNOPTHY: CPT | Performed by: FAMILY MEDICINE

## 2020-12-28 ASSESSMENT — ENCOUNTER SYMPTOMS
DIARRHEA: 0
ABDOMINAL PAIN: 0
CONSTIPATION: 0
RHINORRHEA: 0
WHEEZING: 0
SHORTNESS OF BREATH: 0
NAUSEA: 0
SORE THROAT: 0
VOMITING: 0

## 2020-12-28 NOTE — PROGRESS NOTES
2020     Chief Complaint   Patient presents with    Diabetes     sugars running around 182     HPI  Don Vasquez (:  1960) is a 63 y.o. male, here for evaluation of the following medical concerns:    Patient is a 57-year-old male with a past medical history of type 2 diabetes uncontrolled, hyperlipidemia, erectile dysfunction who presents today for follow-up of his establishment office appointment and review of labs. Started on dulagludite 0.75mg weekly at last apt. Reports he had some mild nausea when starting but this resolved. Patient BG at home in the AM are in the 160-190 range. Reports better energy and decreased urinary frequency. No other concerns today. No low BG    ED: Has not tried viagra yet. Review of Systems   Constitutional: Negative for chills and fever. HENT: Negative for congestion, rhinorrhea and sore throat. Respiratory: Negative for shortness of breath and wheezing. Cardiovascular: Negative for chest pain and leg swelling. Gastrointestinal: Negative for abdominal pain, constipation, diarrhea, nausea and vomiting. Skin: Negative for rash. Neurological: Negative for light-headedness and headaches. Past Medical History:   Diagnosis Date    Diabetes mellitus type 2 in obese ([address]     Diverticulitis     Hyperlipidemia        Prior to Visit Medications    Medication Sig Taking?  Authorizing Provider   Dulaglutide 1.5 MG/0.5ML SOPN Inject 1.5 mg into the skin every 7 days Yes Alex Mcdaniels MD   glimepiride (AMARYL) 4 MG tablet Take 1 tablet with breakfast and 1 with supper Yes Alex Mcdaniels MD   sildenafil (VIAGRA) 50 MG tablet Take 1 tablet by mouth daily as needed for Erectile Dysfunction Yes Alex Mcdaniels MD   metFORMIN (GLUCOPHAGE-XR) 500 MG extended release tablet Take 2 tablets in the am and 2 in the pm Yes Alex Mcdaniels MD   fenofibrate (TRIGLIDE) 160 MG tablet Take 1 tablet by mouth daily Yes Margaret Perales DO No Known Allergies    Social History     Tobacco Use    Smoking status: Current Every Day Smoker     Packs/day: 0.50     Years: 43.00     Pack years: 21.50     Start date: 1977    Smokeless tobacco: Never Used   Substance Use Topics    Alcohol use: Not Currently           Vitals:    12/28/20 1605   BP: 114/68   Pulse: 70   Resp: 16   Temp: 97.5 °F (36.4 °C)   TempSrc: Temporal   SpO2: 97%   Weight: 212 lb (96.2 kg)   Height: 5' 10\" (1.778 m)     Estimated body mass index is 30.42 kg/m² as calculated from the following:    Height as of this encounter: 5' 10\" (1.778 m). Weight as of this encounter: 212 lb (96.2 kg). Physical Exam  Constitutional:       Appearance: He is well-developed. HENT:      Head: Normocephalic. Eyes:      Extraocular Movements: Extraocular movements intact. Conjunctiva/sclera: Conjunctivae normal.   Cardiovascular:      Rate and Rhythm: Normal rate and regular rhythm. Heart sounds: Normal heart sounds. No murmur. Pulmonary:      Effort: Pulmonary effort is normal.      Breath sounds: Normal breath sounds. No wheezing or rales. Abdominal:      General: Bowel sounds are normal.      Palpations: Abdomen is soft. Tenderness: There is no abdominal tenderness. Musculoskeletal:      Right lower leg: No edema. Left lower leg: No edema. Skin:     Findings: No rash. Neurological:      Mental Status: He is alert. Comments: Cranial nerves grossly intact         ASSESSMENT/PLAN:  Funmilayo Frederick was seen today for diabetes. Diagnoses and all orders for this visit:    Type 2 diabetes mellitus with other specified complication, without long-term current use of insulin ([address]  Comments:  Improved BG. Increase Trulicity to 3.7KT injx weekly. Pt advised to watch for BG <100. Call with such readings. Pt also to hold amaryl if low BG. Call w/ SE.    Orders:  -     Dulaglutide 1.5 MG/0.5ML SOPN; Inject 1.5 mg into the skin every 7 days

## 2021-01-18 DIAGNOSIS — E11.69 TYPE 2 DIABETES MELLITUS WITH OTHER SPECIFIED COMPLICATION, WITHOUT LONG-TERM CURRENT USE OF INSULIN (HCC): ICD-10-CM

## 2021-01-18 RX ORDER — METFORMIN HYDROCHLORIDE 500 MG/1
TABLET, EXTENDED RELEASE ORAL
Qty: 120 TABLET | Refills: 3 | Status: SHIPPED
Start: 2021-01-18 | End: 2021-02-22 | Stop reason: SDUPTHER

## 2021-01-25 ENCOUNTER — OFFICE VISIT (OUTPATIENT)
Dept: PRIMARY CARE CLINIC | Age: 61
End: 2021-01-25
Payer: COMMERCIAL

## 2021-01-25 VITALS
HEART RATE: 80 BPM | HEIGHT: 70 IN | WEIGHT: 221 LBS | RESPIRATION RATE: 18 BRPM | TEMPERATURE: 97.5 F | OXYGEN SATURATION: 97 % | DIASTOLIC BLOOD PRESSURE: 78 MMHG | SYSTOLIC BLOOD PRESSURE: 120 MMHG | BODY MASS INDEX: 31.64 KG/M2

## 2021-01-25 DIAGNOSIS — E78.2 MIXED HYPERLIPIDEMIA: ICD-10-CM

## 2021-01-25 DIAGNOSIS — N52.9 ERECTILE DYSFUNCTION, UNSPECIFIED ERECTILE DYSFUNCTION TYPE: ICD-10-CM

## 2021-01-25 DIAGNOSIS — E66.09 CLASS 1 OBESITY DUE TO EXCESS CALORIES WITH SERIOUS COMORBIDITY AND BODY MASS INDEX (BMI) OF 30.0 TO 30.9 IN ADULT: ICD-10-CM

## 2021-01-25 DIAGNOSIS — M65.30 TRIGGER FINGER, UNSPECIFIED FINGER, UNSPECIFIED LATERALITY: ICD-10-CM

## 2021-01-25 DIAGNOSIS — E11.69 TYPE 2 DIABETES MELLITUS WITH OTHER SPECIFIED COMPLICATION, WITHOUT LONG-TERM CURRENT USE OF INSULIN (HCC): Primary | ICD-10-CM

## 2021-01-25 PROCEDURE — 4004F PT TOBACCO SCREEN RCVD TLK: CPT | Performed by: FAMILY MEDICINE

## 2021-01-25 PROCEDURE — 99214 OFFICE O/P EST MOD 30 MIN: CPT | Performed by: FAMILY MEDICINE

## 2021-01-25 PROCEDURE — 2022F DILAT RTA XM EVC RTNOPTHY: CPT | Performed by: FAMILY MEDICINE

## 2021-01-25 PROCEDURE — 3017F COLORECTAL CA SCREEN DOC REV: CPT | Performed by: FAMILY MEDICINE

## 2021-01-25 PROCEDURE — 3046F HEMOGLOBIN A1C LEVEL >9.0%: CPT | Performed by: FAMILY MEDICINE

## 2021-01-25 PROCEDURE — G8484 FLU IMMUNIZE NO ADMIN: HCPCS | Performed by: FAMILY MEDICINE

## 2021-01-25 PROCEDURE — G8427 DOCREV CUR MEDS BY ELIG CLIN: HCPCS | Performed by: FAMILY MEDICINE

## 2021-01-25 PROCEDURE — G8417 CALC BMI ABV UP PARAM F/U: HCPCS | Performed by: FAMILY MEDICINE

## 2021-01-25 ASSESSMENT — ENCOUNTER SYMPTOMS
RHINORRHEA: 0
NAUSEA: 0
SHORTNESS OF BREATH: 0
DIARRHEA: 0
ABDOMINAL PAIN: 0
VOMITING: 0
SORE THROAT: 0
CONSTIPATION: 0
WHEEZING: 0

## 2021-01-25 ASSESSMENT — PATIENT HEALTH QUESTIONNAIRE - PHQ9
SUM OF ALL RESPONSES TO PHQ QUESTIONS 1-9: 0
SUM OF ALL RESPONSES TO PHQ QUESTIONS 1-9: 0

## 2021-01-25 NOTE — PROGRESS NOTES
2021     Chief Complaint   Patient presents with    Diabetes     follow up      HPI  Miriam Foster (:  1960) is a 57 y.o. male, here for evaluation of the following medical concerns:    Patient is a 59-year-old male with a past medical history of type 2 diabetes uncontrolled, hyperlipidemia, erectile dysfunction who presents today for follow-up of DM. DM: At the patient's last office appointment his Trulicity was increased to 1.5 mg weekly. Patient is following up this increase. Patient today reports that his blood glucose at home has been good. Patient denies any issues with the increase in his dosage. Patient reports BG in am is in the low 100's. No low BG. No BG above 200. Will be due for labs at next apt. ED: Has yet to start medication. HM: Needs updated. Review of Systems   Constitutional: Negative for chills and fever. HENT: Negative for congestion, rhinorrhea and sore throat. Respiratory: Negative for shortness of breath and wheezing. Cardiovascular: Negative for chest pain and leg swelling. Gastrointestinal: Negative for abdominal pain, constipation, diarrhea, nausea and vomiting. Skin: Negative for rash. Neurological: Negative for light-headedness and headaches. Past Medical History:   Diagnosis Date    Diabetes mellitus type 2 in obese ([address]     Diverticulitis     Hyperlipidemia        Prior to Visit Medications    Medication Sig Taking?  Authorizing Provider   Dulaglutide 1.5 MG/0.5ML SOPN Inject 1.5 mg into the skin every 7 days Yes Alena Mclain MD   metFORMIN (GLUCOPHAGE-XR) 500 MG extended release tablet Take 2 tablets in the am and 2 in the pm Yes Alena Mclain MD   glimepiride (AMARYL) 4 MG tablet Take 1 tablet with breakfast and 1 with supper Yes Alena Mclain MD   sildenafil (VIAGRA) 50 MG tablet Take 1 tablet by mouth daily as needed for Erectile Dysfunction Yes Alena Mclain MD fenofibrate (TRIGLIDE) 160 MG tablet Take 1 tablet by mouth daily Yes Stuart Lim, DO        No Known Allergies    Social History     Tobacco Use    Smoking status: Current Every Day Smoker     Packs/day: 0.50     Years: 43.00     Pack years: 21.50     Start date: 1977    Smokeless tobacco: Never Used   Substance Use Topics    Alcohol use: Not Currently           Vitals:    01/25/21 0840   BP: 120/78   Pulse: 80   Resp: 18   Temp: 97.5 °F (36.4 °C)   TempSrc: Temporal   SpO2: 97%   Weight: 221 lb (100.2 kg)   Height: 5' 10\" (1.778 m)     Estimated body mass index is 31.71 kg/m² as calculated from the following:    Height as of this encounter: 5' 10\" (1.778 m). Weight as of this encounter: 221 lb (100.2 kg). Physical Exam  Constitutional:       Appearance: He is well-developed. HENT:      Head: Normocephalic. Eyes:      Conjunctiva/sclera: Conjunctivae normal.      Pupils: Pupils are equal, round, and reactive to light. Cardiovascular:      Rate and Rhythm: Normal rate and regular rhythm. Heart sounds: Normal heart sounds. No murmur. Pulmonary:      Effort: Pulmonary effort is normal.      Breath sounds: Normal breath sounds. No wheezing or rales. Abdominal:      General: Bowel sounds are normal.      Palpations: Abdomen is soft. Tenderness: There is no abdominal tenderness. Neurological:      Mental Status: He is alert. Comments: Cranial nerves grossly intact         ASSESSMENT/PLAN:  Alexandria Scruggs was seen today for diabetes. Diagnoses and all orders for this visit:    Type 2 diabetes mellitus with other specified complication, without long-term current use of insulin ([address]  Comments:  Improved BG. Increase Trulicity to 8.0YA injx weekly. Pt advised to watch for BG <100. Call with such readings. Pt also to hold amaryl if low BG. Call w/ SE.    Orders:  -     Dulaglutide 1.5 MG/0.5ML SOPN; Inject 1.5 mg into the skin every 7 days  -     CBC Auto Differential; Future -     Comprehensive Metabolic Panel; Future  -     Lipid Panel; Future    Trigger finger, unspecified finger, unspecified laterality  Comments:  Still an issues. Advised on tumeric. Patient to call if worsening issue and will refer to Ortho. Mixed hyperlipidemia  Comments:  Lifestyle modifications advised. Repeat lipid panel prior to next appointment. Orders:  -     Lipid Panel; Future    Class 1 obesity due to excess calories with serious comorbidity and body mass index (BMI) of 30.0 to 30.9 in adult  Comments:  Lifestyle modifications advised. BMI 31.7    Erectile dysfunction, unspecified erectile dysfunction type  Comments:  Patient has yet to try viagra. Will call with any issues or SE. Other orders  -     Hemoglobin A1C; Future    HM: Will review and update DM screenings at next apt. Return in about 4 months (around 5/25/2021). An [employer] was used to authenticate this note.     --Marycruz Levi MD on 1/25/21 at 8:04 AM EST

## 2021-02-22 DIAGNOSIS — E11.69 TYPE 2 DIABETES MELLITUS WITH OTHER SPECIFIED COMPLICATION, WITHOUT LONG-TERM CURRENT USE OF INSULIN (HCC): ICD-10-CM

## 2021-02-24 RX ORDER — METFORMIN HYDROCHLORIDE 500 MG/1
TABLET, EXTENDED RELEASE ORAL
Qty: 120 TABLET | Refills: 3 | Status: SHIPPED
Start: 2021-02-24 | End: 2021-03-25 | Stop reason: SDUPTHER

## 2021-03-18 DIAGNOSIS — E11.69 TYPE 2 DIABETES MELLITUS WITH OTHER SPECIFIED COMPLICATION, WITHOUT LONG-TERM CURRENT USE OF INSULIN (HCC): ICD-10-CM

## 2021-03-18 RX ORDER — GLIMEPIRIDE 4 MG/1
TABLET ORAL
Qty: 180 TABLET | Refills: 1 | Status: SHIPPED
Start: 2021-03-18 | End: 2021-06-11 | Stop reason: SDUPTHER

## 2021-03-25 DIAGNOSIS — E11.69 TYPE 2 DIABETES MELLITUS WITH OTHER SPECIFIED COMPLICATION, WITHOUT LONG-TERM CURRENT USE OF INSULIN (HCC): ICD-10-CM

## 2021-03-25 RX ORDER — METFORMIN HYDROCHLORIDE 500 MG/1
TABLET, EXTENDED RELEASE ORAL
Qty: 120 TABLET | Refills: 3 | Status: SHIPPED
Start: 2021-03-25 | End: 2021-05-05 | Stop reason: SDUPTHER

## 2021-03-29 ENCOUNTER — TELEPHONE (OUTPATIENT)
Dept: PRIMARY CARE CLINIC | Age: 61
End: 2021-03-29

## 2021-03-29 NOTE — TELEPHONE ENCOUNTER
The pt's wife is calling because the pt went to  a refill on his Dulaglutide and because they haven't met their deductible yet it is going to cost him over $ 700.  She is asking if there is anything else that can be sent over to the pharmacy for him

## 2021-05-05 DIAGNOSIS — E11.69 TYPE 2 DIABETES MELLITUS WITH OTHER SPECIFIED COMPLICATION, WITHOUT LONG-TERM CURRENT USE OF INSULIN (HCC): ICD-10-CM

## 2021-05-05 RX ORDER — METFORMIN HYDROCHLORIDE 500 MG/1
TABLET, EXTENDED RELEASE ORAL
Qty: 120 TABLET | Refills: 3 | Status: SHIPPED
Start: 2021-05-05 | End: 2021-06-11 | Stop reason: SDUPTHER

## 2021-05-24 ENCOUNTER — OFFICE VISIT (OUTPATIENT)
Dept: PRIMARY CARE CLINIC | Age: 61
End: 2021-05-24
Payer: COMMERCIAL

## 2021-05-24 VITALS
TEMPERATURE: 97.5 F | HEIGHT: 70 IN | WEIGHT: 223 LBS | HEART RATE: 69 BPM | OXYGEN SATURATION: 97 % | DIASTOLIC BLOOD PRESSURE: 84 MMHG | SYSTOLIC BLOOD PRESSURE: 128 MMHG | RESPIRATION RATE: 20 BRPM | BODY MASS INDEX: 31.92 KG/M2

## 2021-05-24 DIAGNOSIS — R42 DIZZINESS: ICD-10-CM

## 2021-05-24 DIAGNOSIS — E55.9 VITAMIN D DEFICIENCY: ICD-10-CM

## 2021-05-24 DIAGNOSIS — N52.9 ERECTILE DYSFUNCTION, UNSPECIFIED ERECTILE DYSFUNCTION TYPE: ICD-10-CM

## 2021-05-24 DIAGNOSIS — E78.2 MIXED HYPERLIPIDEMIA: ICD-10-CM

## 2021-05-24 DIAGNOSIS — R20.0 HAND NUMBNESS: ICD-10-CM

## 2021-05-24 DIAGNOSIS — M54.2 NECK PAIN: ICD-10-CM

## 2021-05-24 DIAGNOSIS — M67.912 BILATERAL ROTATOR CUFF DYSFUNCTION: ICD-10-CM

## 2021-05-24 DIAGNOSIS — M67.911 BILATERAL ROTATOR CUFF DYSFUNCTION: ICD-10-CM

## 2021-05-24 DIAGNOSIS — M65.30 TRIGGER FINGER, UNSPECIFIED FINGER, UNSPECIFIED LATERALITY: ICD-10-CM

## 2021-05-24 DIAGNOSIS — E11.69 TYPE 2 DIABETES MELLITUS WITH OTHER SPECIFIED COMPLICATION, WITHOUT LONG-TERM CURRENT USE OF INSULIN (HCC): ICD-10-CM

## 2021-05-24 DIAGNOSIS — M50.30 DDD (DEGENERATIVE DISC DISEASE), CERVICAL: Primary | ICD-10-CM

## 2021-05-24 DIAGNOSIS — E11.69 TYPE 2 DIABETES MELLITUS WITH OTHER SPECIFIED COMPLICATION, WITHOUT LONG-TERM CURRENT USE OF INSULIN (HCC): Primary | ICD-10-CM

## 2021-05-24 DIAGNOSIS — E66.09 CLASS 1 OBESITY DUE TO EXCESS CALORIES WITH SERIOUS COMORBIDITY AND BODY MASS INDEX (BMI) OF 30.0 TO 30.9 IN ADULT: ICD-10-CM

## 2021-05-24 LAB
ALBUMIN SERPL-MCNC: 4.3 G/DL (ref 3.5–5.2)
ALP BLD-CCNC: 50 U/L (ref 40–129)
ALT SERPL-CCNC: 32 U/L (ref 0–40)
ANION GAP SERPL CALCULATED.3IONS-SCNC: 17 MMOL/L (ref 7–16)
AST SERPL-CCNC: 23 U/L (ref 0–39)
BASOPHILS ABSOLUTE: 0.03 E9/L (ref 0–0.2)
BASOPHILS RELATIVE PERCENT: 0.3 % (ref 0–2)
BILIRUB SERPL-MCNC: 0.4 MG/DL (ref 0–1.2)
BUN BLDV-MCNC: 18 MG/DL (ref 6–23)
C-REACTIVE PROTEIN: 0.3 MG/DL (ref 0–0.4)
CALCIUM SERPL-MCNC: 8.9 MG/DL (ref 8.6–10.2)
CHLORIDE BLD-SCNC: 104 MMOL/L (ref 98–107)
CHOLESTEROL, TOTAL: 155 MG/DL (ref 0–199)
CO2: 19 MMOL/L (ref 22–29)
CREAT SERPL-MCNC: 0.6 MG/DL (ref 0.7–1.2)
EOSINOPHILS ABSOLUTE: 0.17 E9/L (ref 0.05–0.5)
EOSINOPHILS RELATIVE PERCENT: 1.9 % (ref 0–6)
GFR AFRICAN AMERICAN: >60
GFR NON-AFRICAN AMERICAN: >60 ML/MIN/1.73
GLUCOSE BLD-MCNC: 169 MG/DL (ref 74–99)
HBA1C MFR BLD: 8.4 % (ref 4–5.6)
HCT VFR BLD CALC: 47.5 % (ref 37–54)
HDLC SERPL-MCNC: 40 MG/DL
HEMOGLOBIN: 15.7 G/DL (ref 12.5–16.5)
IMMATURE GRANULOCYTES #: 0.02 E9/L
IMMATURE GRANULOCYTES %: 0.2 % (ref 0–5)
LDL CHOLESTEROL CALCULATED: 78 MG/DL (ref 0–99)
LYMPHOCYTES ABSOLUTE: 2.59 E9/L (ref 1.5–4)
LYMPHOCYTES RELATIVE PERCENT: 28.7 % (ref 20–42)
MCH RBC QN AUTO: 31.8 PG (ref 26–35)
MCHC RBC AUTO-ENTMCNC: 33.1 % (ref 32–34.5)
MCV RBC AUTO: 96.3 FL (ref 80–99.9)
MONOCYTES ABSOLUTE: 0.52 E9/L (ref 0.1–0.95)
MONOCYTES RELATIVE PERCENT: 5.8 % (ref 2–12)
NEUTROPHILS ABSOLUTE: 5.69 E9/L (ref 1.8–7.3)
NEUTROPHILS RELATIVE PERCENT: 63.1 % (ref 43–80)
PDW BLD-RTO: 12.4 FL (ref 11.5–15)
PLATELET # BLD: 246 E9/L (ref 130–450)
PMV BLD AUTO: 10.3 FL (ref 7–12)
POTASSIUM SERPL-SCNC: 4.6 MMOL/L (ref 3.5–5)
RBC # BLD: 4.93 E12/L (ref 3.8–5.8)
RHEUMATOID FACTOR: <10 IU/ML (ref 0–13)
SODIUM BLD-SCNC: 140 MMOL/L (ref 132–146)
TOTAL PROTEIN: 7 G/DL (ref 6.4–8.3)
TRIGL SERPL-MCNC: 183 MG/DL (ref 0–149)
VITAMIN D 25-HYDROXY: 54 NG/ML (ref 30–100)
VLDLC SERPL CALC-MCNC: 37 MG/DL
WBC # BLD: 9 E9/L (ref 4.5–11.5)

## 2021-05-24 PROCEDURE — 3046F HEMOGLOBIN A1C LEVEL >9.0%: CPT | Performed by: FAMILY MEDICINE

## 2021-05-24 PROCEDURE — G8417 CALC BMI ABV UP PARAM F/U: HCPCS | Performed by: FAMILY MEDICINE

## 2021-05-24 PROCEDURE — 99215 OFFICE O/P EST HI 40 MIN: CPT | Performed by: FAMILY MEDICINE

## 2021-05-24 PROCEDURE — 3017F COLORECTAL CA SCREEN DOC REV: CPT | Performed by: FAMILY MEDICINE

## 2021-05-24 PROCEDURE — 93000 ELECTROCARDIOGRAM COMPLETE: CPT | Performed by: FAMILY MEDICINE

## 2021-05-24 PROCEDURE — 2022F DILAT RTA XM EVC RTNOPTHY: CPT | Performed by: FAMILY MEDICINE

## 2021-05-24 PROCEDURE — 4004F PT TOBACCO SCREEN RCVD TLK: CPT | Performed by: FAMILY MEDICINE

## 2021-05-24 PROCEDURE — G8427 DOCREV CUR MEDS BY ELIG CLIN: HCPCS | Performed by: FAMILY MEDICINE

## 2021-05-24 RX ORDER — TADALAFIL 10 MG/1
10 TABLET ORAL DAILY PRN
Qty: 10 TABLET | Refills: 1 | Status: SHIPPED
Start: 2021-05-24 | End: 2021-06-14

## 2021-05-24 ASSESSMENT — ENCOUNTER SYMPTOMS
DIARRHEA: 0
ABDOMINAL PAIN: 0
VOMITING: 0
SHORTNESS OF BREATH: 0
RHINORRHEA: 0
NAUSEA: 0
SORE THROAT: 0
CONSTIPATION: 0
WHEEZING: 0

## 2021-05-24 NOTE — PROGRESS NOTES
 Hyperlipidemia        Prior to Visit Medications    Medication Sig Taking? Authorizing Provider   tadalafil (CIALIS) 10 MG tablet Take 1 tablet by mouth daily as needed for Erectile Dysfunction Yes Cindy Vora MD   metFORMIN (GLUCOPHAGE-XR) 500 MG extended release tablet Take 2 tablets in the am and 2 in the pm Yes Cindy Vora MD   glimepiride (AMARYL) 4 MG tablet Take 1 tablet with breakfast and 1 with supper Yes Cindy Vora MD   sildenafil (VIAGRA) 50 MG tablet Take 1 tablet by mouth daily as needed for Erectile Dysfunction Yes Cindy Vora MD   fenofibrate (TRIGLIDE) 160 MG tablet Take 1 tablet by mouth daily Yes Estefanía Zavala DO   Dulaglutide 1.5 MG/0.5ML SOPN Inject 1.5 mg into the skin every 7 days  Patient not taking: Reported on 5/24/2021  Cindy Vora MD        No Known Allergies    Social History     Tobacco Use    Smoking status: Current Every Day Smoker     Packs/day: 0.50     Years: 43.00     Pack years: 21.50     Start date: 1977    Smokeless tobacco: Never Used   Substance Use Topics    Alcohol use: Not Currently           Vitals:    05/24/21 0807   BP: 128/84   Pulse: 69   Resp: 20   Temp: 97.5 °F (36.4 °C)   TempSrc: Temporal   SpO2: 97%   Weight: 223 lb (101.2 kg)   Height: 5' 10\" (1.778 m)     Estimated body mass index is 32 kg/m² as calculated from the following:    Height as of this encounter: 5' 10\" (1.778 m). Weight as of this encounter: 223 lb (101.2 kg). Physical Exam  Constitutional:       Appearance: He is well-developed. HENT:      Head: Normocephalic. Eyes:      Extraocular Movements: Extraocular movements intact. Conjunctiva/sclera: Conjunctivae normal.   Cardiovascular:      Rate and Rhythm: Normal rate and regular rhythm. Heart sounds: Normal heart sounds. No murmur heard. Pulmonary:      Effort: Pulmonary effort is normal.      Breath sounds: Normal breath sounds. No wheezing or rales.    Abdominal:      General: Bowel sounds are normal.      Palpations: Abdomen is soft. Tenderness: There is no abdominal tenderness. Musculoskeletal:      Right shoulder: Tenderness present. Decreased range of motion. Cervical back: Tenderness present. Pain with movement present. Right lower leg: No edema. Left lower leg: No edema. Neurological:      Mental Status: He is alert. Comments: Cranial nerves grossly intact         ASSESSMENT/PLAN:  Brandon Jovel was seen today for diabetes and swelling. Diagnoses and all orders for this visit:    Type 2 diabetes mellitus with other specified complication, without long-term current use of insulin ([address]  -     Comprehensive Metabolic Panel; Future  -     CBC Auto Differential; Future  -     Lipid Panel; Future  -     Hemoglobin A1C; Future  -     EKG 12 lead; Future  -     Via Annabelle Mclaughlin, Oliverio Samuel DPM, Podiatry, [employer]  Uncontrolled. Labs due for repeat. Patient needs to contact insurance for coverage medications. Patient may need to be on insulin. Continue current regimen. Ideally would restart GLP-1 antagonist. Due for diabetic eye and foot exams. Reports lower extremity neuropathy. Trigger finger, unspecified finger, unspecified laterality  -     EMG; Future  -     XR HAND RIGHT (2 VIEWS); Future  -     C-Reactive Protein; Future  -     JADE; Future  -     Rheumatoid Factor; Future  Worsening. Check inflammatory markers. Workup as noted above. Consider referral to orthopedic surgery. Mixed hyperlipidemia  -     Comprehensive Metabolic Panel; Future  -     CBC Auto Differential; Future  -     Lipid Panel; Future  Lifestyle modifications reviewed and advise. Class 1 obesity due to excess calories with serious comorbidity and body mass index (BMI) of 30.0 to 30.9 in adult  Lifestyle modifications reviewed and advise. Erectile dysfunction, unspecified erectile dysfunction type  -     tadalafil (CIALIS) 10 MG tablet;  Take 1 tablet by mouth daily as needed for Erectile Dysfunction  Unable to tolerate Viagra. Discontinue. Trial of Cialis. Side effects reviewed. Patient tolerated well without any side effects. Bilateral rotator cuff dysfunction  Right greater than left. Reports bilateral for pain. Worsening over the last several months. Exam shows positive left rotator cuff dysfunction testing. Check x-ray. Check EMG/MCV. Consider referral to orthopedics versus physical therapy. Vitamin D deficiency  -     Vitamin D 25 Hydroxy; Future    Hand numbness  -     EMG; Future  -     XR HAND LEFT (2 VIEWS); Future  -     XR HAND RIGHT (2 VIEWS); Future  -     C-Reactive Protein; Future  -     JADE; Future  -     Rheumatoid Factor; Future  Mild swelling on exam. Multiple nodules palpable throughout the patient's hand. Positive numbness per report. May be secondary to the patient's cervical pathology versus local pathology versus sequela from diabetes. Dizziness  -     EKG 12 lead; Future  Random episodes. Recently worsening blood glucose. Possibly BPPV. No LOC. Brief episodes per report. EKG was performed today and showed sinus rhythm. Patient denies any significant palpitations. Neck pain  -     XR CERVICAL SPINE (4-5 VIEWS); Future  Right tenderness greater than left. Concerns for arthritic-like changes and/or degenerative disc disease. Check x-ray. Consider referral to specialist and physical therapy. Return in about 4 weeks (around 6/21/2021) for Physical.    Health maintenance: Pneumonia vaccine, shingles vaccine, Covid vaccine. Declined update health maintenance issues. Patient is to complete all the above workup. We will make recommendations based off of such. He is to contact his insurance for alternative diabetic medications. We will see him back in one month to review such in person and her modifications of his current medication regimen. Greater then 40 minutes was spend on this encounter. An [employer]ature was used to authenticate this note. --Estrella Mccloud MD on 5/24/21 at 8:06 AM EDT

## 2021-05-25 LAB — ANTI-NUCLEAR ANTIBODY (ANA): NEGATIVE

## 2021-05-26 ENCOUNTER — HOSPITAL ENCOUNTER (OUTPATIENT)
Dept: NEUROLOGY | Age: 61
Discharge: HOME | End: 2021-05-26
Payer: COMMERCIAL

## 2021-05-26 VITALS — HEIGHT: 70 IN | WEIGHT: 223 LBS | BODY MASS INDEX: 31.92 KG/M2

## 2021-05-26 DIAGNOSIS — M50.30 DDD (DEGENERATIVE DISC DISEASE), CERVICAL: Primary | ICD-10-CM

## 2021-05-26 DIAGNOSIS — G56.03 BILATERAL CARPAL TUNNEL SYNDROME: ICD-10-CM

## 2021-05-26 PROCEDURE — 95913 NRV CNDJ TEST 13/> STUDIES: CPT

## 2021-05-26 PROCEDURE — 95886 MUSC TEST DONE W/N TEST COMP: CPT | Performed by: PHYSICAL MEDICINE & REHABILITATION

## 2021-05-26 PROCEDURE — 95913 NRV CNDJ TEST 13/> STUDIES: CPT | Performed by: PHYSICAL MEDICINE & REHABILITATION

## 2021-05-26 PROCEDURE — 95886 MUSC TEST DONE W/N TEST COMP: CPT

## 2021-05-26 NOTE — PROCEDURES
[facility]  [address], [address]  Phone: [phone]  Fax: [phone]      Referring Provider: Mirella Mcintyre MD  Primary Care Physician: Helen Sierra MD  Patient Name: Trinity Ventura  Patient YOB: 1960  Gender: male  BMI: Body mass index is 32 kg/m². Height 5' 10\" (1.778 m), weight 223 lb (101.2 kg). 5/26/2021    Description of clinical problem:   CC: bilateral hand numbness and pain    Pain Yes, Numbness/tingling  Yes; Weakness  Yes, hand swelling. Brief physical exam:   Sensory deficit Yes; Weakness Yes, Reflex abnormality No    Motor NCS      Nerve / Sites Lat. Amplitude Distance Lat Diff Velocity Temp.    ms mV cm ms m/s °C   R Median - APB      Wrist 10.94 2.0 8   32.5      Elbow 16.82 1.7 20 5.89 34 32.7   L Median - APB      Wrist 8.49 6.1 8   34.3      Elbow 13.70 5.8 20 5.21 38 34.4   R Ulnar - ADM      Wrist 3.80 8.7 8   32.5      B. Elbow 8.49 8.5 22 4.69 47 32.5      A. Elbow 10.78 7.9 10 2.29 44 32.5   L Ulnar - ADM      Wrist 3.49 8.8 8   34.8      B. Elbow 8.23 8.2 22 4.74 46 34.1      A. Elbow 11.46 7.9 10 3.23 31 33.9   L Ulnar - FDI      Wrist 3.70 3.2    32.7      B. Elbow 8.39 2.7 22 4.69 47 32.7      A. Elbow 11.51 2.7 10 3.12 32 32.7         Sensory NCS      Nerve / Sites Peak Lat PP Amp Distance Velocity Temp.     ms µV cm m/s °C   R Median - Digit II (Antidromic)      Mid Palm 9.53 16.3 7 10 32.4      Wrist 13.91 15.4 14 11 32.5   L Median - Digit II (Antidromic)      Mid Palm 5.78 7.3 7 15 33      Wrist 6.04 4.7 14 29 33   R Ulnar - Digit V (Antidromic)      Wrist 3.80 12.7 14 45 32.4   L Ulnar - Digit V (Antidromic)      Wrist 3.91 7.1 14 45 33.3   R Radial - Anatomical snuff box (Forearm)      Forearm 2.71 12.2 10 55 32.6   L Radial - Anatomical snuff box (Forearm)      Forearm 2.71 17.2 10 48 34.7   R Dorsal ulnar cutaneous - Hand dorsum (Forearm)      Forearm 3.13 6.0 8 30 33.3   L Dorsal ulnar cutaneous - Hand dorsum (Forearm)      Forearm 2.29 2.6 8 48 32.6       F  Wave      Nerve F Lat M Lat F-M Lat    ms ms ms   R Median - APB 45.8 5.1 40.8   R Ulnar - ADM 33.1 2.8 30.3   L Median - APB 39.9 7.6 32.4   L Ulnar - ADM 34.6 3.4 31.2       EMG         EMG Summary Table     Spontaneous MUAP Recruitment   Muscle IA Fib PSW Fasc H.F. Amp Dur. PPP Pattern   R. Biceps brachii N None None None None N N N N   R. Triceps brachii N None None None None N N N N   R. Pronator teres N None None None None N N N N   R. Flexor carpi ulnaris N None None None None N N N Decr   R. First dorsal interosseous N None None None None N N N N   R. Abductor pollicis brevis Incr 1+ 1+ None None N N 1+ Decr   L. Biceps brachii N None None None None N N N N   L. Triceps brachii N None None None None N N N N   L. Pronator teres N None None None None N N N Decr   L. Flexor carpi ulnaris N None None None None N N 1+ Decr   L. First dorsal interosseous N None None None None N N 1+ Decr   L. Abductor pollicis brevis N None None None None N N N Decr   L. Cervical paraspinals (mid) N None None None None N N N N   L. Cervical paraspinals (low) N None None None None N N N N   R. Cervical paraspinals (mid) N None None None None N N N N   R. Cervical paraspinals (low) N None None None None N N N N       Study Limitations:  None     Summary of Findings:    1. Sensory nerve conduction studies of the right median nerve showed prolonged peak wrist latency and slow conduction velocity. The left median nerve showed prolonged peak wrist latency, small amplitudes and slow conduction velocity. Bilateral dorsal ulnar cutaneous nerves showed small amplitudes. All others tested showed normal peak latencies, normal SNAP amplitudes, and normal conduction velocities. 2. Motor nerve conduction studies of the right median nerve showed prolonged distal wrist latency, small amplitudes and slowing in the forearm.  The left median nerve showed prolonged distal wrist latency and slowing in the forearm. The right ulnar nerve showed prolonged distal wrist latency without motor slowing across the elbow. The left ulnar nerve recording of abductor digiti minimi showed motor slowing across the elbow. The left ulnar nerve recording at first dorsal interosseous showed small amplitudes and slowing across the elbow. 3. F-wave studies of all nerves tested revealed prolonged latencies. 4. EMG was performed with monopolar needle in multiple muscles outlined above, including the cervical paraspinals. There was active denervation with chronic re-innervation potentials in the right abductor pollicis brevis muscle. There were chronic neuropathic changes in the left ulnar innervated muscles as above. There was reduced recruitment in the left abductor pollicis brevis and right flexor carpi ulnaris muscle. All muscles tested revealed normal insertional activity, without evidence of abnormal spontaneous activity. All MUAP's were of normal amplitude and duration with a full recruitment pattern. No increase in polyphasic potentials was noted. Diagnostic Interpretation: This study was abnormal.     1. There is electrodiagnostic evidence for right sensorimotor median mononeuropathy at or about the wrist, mixed demyelinating and axonal in nature, severe in severity, with evidence of active denervation. It is chronic in duration. This is consistent with the clinical diagnosis of right severe carpal tunnel syndrome. Prognosis is good for demyelinating lesions, poor for axonal.    2. There is electrodiagnostic evidence for left sensorimotor median mononeuropathy at or about the wrist, mixed demyelinating and axonal in nature, moderately severy in severity, without evidence of active denervation. It is chronic in duration. This is consistent with the clinical diagnosis of moderately severe left carpal tunnel syndrome.  Prognosis is good for demyelinating lesions, poor for

## 2021-06-10 ENCOUNTER — OFFICE VISIT (OUTPATIENT)
Dept: ORTHOPEDIC SURGERY | Age: 61
End: 2021-06-10
Payer: COMMERCIAL

## 2021-06-10 VITALS — HEIGHT: 70 IN | BODY MASS INDEX: 30.06 KG/M2 | WEIGHT: 210 LBS

## 2021-06-10 DIAGNOSIS — G56.03 BILATERAL CARPAL TUNNEL SYNDROME: Primary | ICD-10-CM

## 2021-06-10 PROCEDURE — G8417 CALC BMI ABV UP PARAM F/U: HCPCS | Performed by: ORTHOPAEDIC SURGERY

## 2021-06-10 PROCEDURE — 99203 OFFICE O/P NEW LOW 30 MIN: CPT | Performed by: ORTHOPAEDIC SURGERY

## 2021-06-10 PROCEDURE — 4004F PT TOBACCO SCREEN RCVD TLK: CPT | Performed by: ORTHOPAEDIC SURGERY

## 2021-06-10 PROCEDURE — 3017F COLORECTAL CA SCREEN DOC REV: CPT | Performed by: ORTHOPAEDIC SURGERY

## 2021-06-10 PROCEDURE — G8427 DOCREV CUR MEDS BY ELIG CLIN: HCPCS | Performed by: ORTHOPAEDIC SURGERY

## 2021-06-10 PROCEDURE — 20526 THER INJECTION CARP TUNNEL: CPT | Performed by: ORTHOPAEDIC SURGERY

## 2021-06-10 RX ORDER — TRIAMCINOLONE ACETONIDE 40 MG/ML
20 INJECTION, SUSPENSION INTRA-ARTICULAR; INTRAMUSCULAR ONCE
Status: COMPLETED | OUTPATIENT
Start: 2021-06-10 | End: 2021-06-10

## 2021-06-10 RX ORDER — BUPIVACAINE HYDROCHLORIDE 2.5 MG/ML
0.5 INJECTION, SOLUTION INFILTRATION; PERINEURAL ONCE
Status: COMPLETED | OUTPATIENT
Start: 2021-06-10 | End: 2021-06-10

## 2021-06-10 RX ADMIN — BUPIVACAINE HYDROCHLORIDE 1.25 MG: 2.5 INJECTION, SOLUTION INFILTRATION; PERINEURAL at 15:44

## 2021-06-10 RX ADMIN — TRIAMCINOLONE ACETONIDE 20 MG: 40 INJECTION, SUSPENSION INTRA-ARTICULAR; INTRAMUSCULAR at 15:44

## 2021-06-11 DIAGNOSIS — E11.69 TYPE 2 DIABETES MELLITUS WITH OTHER SPECIFIED COMPLICATION, WITHOUT LONG-TERM CURRENT USE OF INSULIN (HCC): ICD-10-CM

## 2021-06-11 RX ORDER — METFORMIN HYDROCHLORIDE 500 MG/1
TABLET, EXTENDED RELEASE ORAL
Qty: 120 TABLET | Refills: 3 | Status: SHIPPED
Start: 2021-06-11 | End: 2021-07-08 | Stop reason: SDUPTHER

## 2021-06-11 RX ORDER — GLIMEPIRIDE 4 MG/1
TABLET ORAL
Qty: 180 TABLET | Refills: 1 | Status: SHIPPED
Start: 2021-06-11 | End: 2021-09-27 | Stop reason: ALTCHOICE

## 2021-06-11 NOTE — PROGRESS NOTES
Chief Complaint:   Chief Complaint   Patient presents with    Hand Pain     Bilateral hand pain, numbness and tingling x 4 months. Right hand is worse than left.  RHD. EMG studies BUE complete. HPI     Jerry Ron is a 57 y.o. male, who presents with chronic numbness tingling in fingers of both hands, this is been chronic and preexistent but over the past 4 months progressive now more constant severe and disabling him from daily activities as well as sleep. No history of injury, patient states his health to be good although he does have controlled diabetes. He is right-hand dominant works in a local car [employer] and the symptoms of numbness and at times weakness in both hands has become problematic there. Has taken vitamin B orally for some time which does not seem to be helping any longer. Numbness and tingling are noted both in the radial and ulnar distributions of both hands, symptoms generally worse in the right than left hand. Recently patient also is having intermittent painful locking of the ring finger of both hands. He is very active playing golf on a regular basis also becoming more difficult secondary to the symptoms. Allergies; medications; past medical, surgical, family, and social history; and problem list have been reviewed today and updated as indicated in this encounter - see below following Ortho specifics. Musculoskeletal: Overweight otherwise healthy-appearing middle-age male. Upper extremity exam shows no deformities, there is reproducible snapping and locking of the ring fingers with active flexion extension, no acute erythema of the flexor sheaths although tender over A1. No appreciable intrinsic or thenar atrophy of either hand, Tinel's and Phalen's are grossly positive at bilateral wrists, Tinel's negative at the cubital tunnel bilaterally. Radiologic Studies: Deferred not applicable. ASSESSMENT/PLAN:    Jamaal Waterman was seen today for hand pain.     Diagnoses and all orders for this visit:    Bilateral carpal tunnel syndrome  -     [place] INJECT TENDON SHEATH/LIGAMENT    Other orders  -     triamcinolone acetonide (KENALOG-40) injection 20 mg  -     bupivacaine (MARCAINE) 0.25 % injection 1.25 mg       The patient is already had appropriate EMG nerve conduction studies that show mixed pattern but consistent with severe bilateral carpal tunnel syndrome, also cubital tunnel syndrome, also peripheral neuropathy. The majority of her symptoms seem to be related to the carpal tunnel syndrome, we talked about treatment alternatives including bracing, injections, and the possibility for surgical release with what may be incomplete benefit. Questions were asked answered and understood, at this point at the patient's request I injected bilateral carpal tunnels with Kenalog and Marcaine no difficulty or complication tolerated well. He may try off-the-shelf bracing if he desires, follow-up in a month for assessment of outcome on the injection and possible consideration for elective carpal tunnel release. Return in about 1 month (around 7/10/2021). Thierno Montiel MD    6/11/2021  9:31 AM      Patient Active Problem List   Diagnosis    Diabetes mellitus ([facility] [address]    Mixed hyperlipidemia    Erectile dysfunction    Trigger finger, unspecified finger    Bilateral rotator cuff dysfunction       Past Medical History:   Diagnosis Date    Diabetes mellitus type 2 in obese ([address]     Diverticulitis     Hyperlipidemia        History reviewed. No pertinent surgical history.     Current Outpatient Medications   Medication Sig Dispense Refill    metFORMIN (GLUCOPHAGE-XR) 500 MG extended release tablet Take 2 tablets in the am and 2 in the pm 120 tablet 3    glimepiride (AMARYL) 4 MG tablet Take 1 tablet with breakfast and 1 with supper 180 tablet 1    fenofibrate (TRIGLIDE) 160 MG tablet Take 1 tablet by mouth daily 90 tablet 1    tadalafil (CIALIS) 10 MG tablet Take 1 tablet by mouth daily as needed for Erectile Dysfunction (Patient not taking: Reported on 6/10/2021) 10 tablet 1    Dulaglutide 1.5 MG/0.5ML SOPN Inject 1.5 mg into the skin every 7 days (Patient not taking: Reported on 5/24/2021) 4 pen 5    sildenafil (VIAGRA) 50 MG tablet Take 1 tablet by mouth daily as needed for Erectile Dysfunction (Patient not taking: Reported on 6/10/2021) 15 tablet 0     No current facility-administered medications for this visit. No Known Allergies    Social History     Socioeconomic History    Marital status: Single     Spouse name: None    Number of children: None    Years of education: None    Highest education level: None   Occupational History    None   Tobacco Use    Smoking status: Current Every Day Smoker     Packs/day: 0.25     Years: 43.00     Pack years: 10.75     Start date: 1977    Smokeless tobacco: Never Used   Substance and Sexual Activity    Alcohol use: Not Currently    Drug use: Never    Sexual activity: None   Other Topics Concern    None   Social History Narrative    None     Social Determinants of Health     Financial Resource Strain:     Difficulty of Paying Living Expenses:    Food Insecurity:     Worried About Running Out of Food in the Last Year:     Ran Out of Food in the Last Year:    Transportation Needs:     Lack of Transportation (Medical):      Lack of Transportation (Non-Medical):    Physical Activity:     Days of Exercise per Week:     Minutes of Exercise per Session:    Stress:     Feeling of Stress :    Social Connections:     Frequency of Communication with Friends and Family:     Frequency of Social Gatherings with Friends and Family:     Attends Restoration Services:     Active Member of Clubs or Organizations:     Attends Club or Organization Meetings:     Marital Status:    Intimate Partner Violence:     Fear of Current or Ex-Partner:     Emotionally Abused:     Physically Abused:     Sexually Abused:        Family History Problem Relation Age of Onset    Stroke Mother     Diabetes Mother     Heart Attack Mother     Cancer Father         leukemia    Cancer Sister         leukemia         Review of Systems  As follows except as previously noted in HPI:  Constitutional: Negative for chills, diaphoresis, fatigue, fever and unexpected weight change. Respiratory: Negative for cough, shortness of breath and wheezing. Cardiovascular: Negative for chest pain and palpitations. Neurological: Negative for dizziness, syncope, cephalgia. GI / : negative  Musculoskeletal: see HPI       Objective:   Physical Exam   Constitutional: Oriented to person, place, and time. and appears well-developed and well-nourished. :   Head: Normocephalic and atraumatic. Eyes: EOM are normal.   Neck: Neck supple. Cardiovascular: Normal rate and regular rhythm. Pulmonary/Chest: Effort normal. No stridor. No respiratory distress, no wheezes. Abdominal:  No abnormal distension. Neurological: Alert and oriented to person, place, and time. Skin: Skin is warm and dry. Psychiatric: Normal mood and affect.  Behavior is normal. Thought content normal.

## 2021-06-14 ENCOUNTER — OFFICE VISIT (OUTPATIENT)
Dept: PODIATRY | Age: 61
End: 2021-06-14
Payer: COMMERCIAL

## 2021-06-14 VITALS — HEIGHT: 70 IN | WEIGHT: 210 LBS | BODY MASS INDEX: 30.06 KG/M2

## 2021-06-14 DIAGNOSIS — G60.8 HEREDITARY SENSORY NEUROPATHY: ICD-10-CM

## 2021-06-14 DIAGNOSIS — L85.3 XEROSIS CUTIS: ICD-10-CM

## 2021-06-14 DIAGNOSIS — E11.9 TYPE 2 DIABETES MELLITUS WITHOUT COMPLICATION, UNSPECIFIED WHETHER LONG TERM INSULIN USE (HCC): ICD-10-CM

## 2021-06-14 DIAGNOSIS — B35.1 TINEA UNGUIUM: Primary | ICD-10-CM

## 2021-06-14 PROCEDURE — 3017F COLORECTAL CA SCREEN DOC REV: CPT | Performed by: PODIATRIST

## 2021-06-14 PROCEDURE — 99203 OFFICE O/P NEW LOW 30 MIN: CPT | Performed by: PODIATRIST

## 2021-06-14 PROCEDURE — G8427 DOCREV CUR MEDS BY ELIG CLIN: HCPCS | Performed by: PODIATRIST

## 2021-06-14 PROCEDURE — 3052F HG A1C>EQUAL 8.0%<EQUAL 9.0%: CPT | Performed by: PODIATRIST

## 2021-06-14 PROCEDURE — G8417 CALC BMI ABV UP PARAM F/U: HCPCS | Performed by: PODIATRIST

## 2021-06-14 PROCEDURE — 2022F DILAT RTA XM EVC RTNOPTHY: CPT | Performed by: PODIATRIST

## 2021-06-14 PROCEDURE — 4004F PT TOBACCO SCREEN RCVD TLK: CPT | Performed by: PODIATRIST

## 2021-06-14 RX ORDER — AMMONIUM LACTATE 12 G/100G
LOTION TOPICAL
Qty: 400 G | Refills: 2 | Status: SHIPPED
Start: 2021-06-14 | End: 2021-09-27 | Stop reason: ALTCHOICE

## 2021-06-14 NOTE — LETTER
Mackie Landau, MD   [address] [address] [address]     Patient: Jory Tolbert  YOB: 1960  Date of Visit: 6/21/2021     Dear Mackie Landau, MD    Thank you for referring Jory Tolbert to me for evaluation. Ren Phillips was seen today diabetic foot ankle exam.  Did recommend ammonium lactate lotion 12% twice daily. I will follow-up 6 months diabetic foot ankle exam.    Once again, thank you very much for this kind referral and allowing me to participate in the care of this patient. If you have questions, please do not hesitate to call me.     Sincerely,        AJAY [place] [address], [address] [address]  Phone: [phone]  Fax: [phone]

## 2021-06-14 NOTE — PROGRESS NOTES
Sandor Bunn is here today for a diabetic foot exam. his PCP is Eldno Montano MD last OV was 2021. Sandor Bunn : 1960 Sex: male  Age: 57 y.o. Patient was referred by Eldon Montano MD    CC:   Diabetic foot exam       HPI:   This pleasant 69-year-old male patient referred me today diabetic foot ankle exam.  Does continue Metformin. No open skin lesions or abrasions. No foot or ankle pain today. Does have some dry skin on bottom both heels. No recent injuries foot or ankle related. No additional pedal complaints at this time. ROS:  Const: Denies constitutional symptoms  Musculo: Denies symptoms other than stated above  Skin: Denies symptoms other than stated above      Current Outpatient Medications:     ammonium lactate (LAC-HYDRIN) 12 % lotion, Apply topically twice daily, Disp: 400 g, Rfl: 2    glimepiride (AMARYL) 4 MG tablet, Take 1 tablet with breakfast and 1 with supper, Disp: 180 tablet, Rfl: 1    metFORMIN (GLUCOPHAGE-XR) 500 MG extended release tablet, Take 2 tablets in the am and 2 in the pm, Disp: 120 tablet, Rfl: 3    fenofibrate (TRIGLIDE) 160 MG tablet, Take 1 tablet by mouth daily, Disp: 90 tablet, Rfl: 1  No Known Allergies    Past Medical History:   Diagnosis Date    Diabetes mellitus type 2 in obese ([address]     Diverticulitis     Hyperlipidemia        There were no vitals filed for this visit. Work History/Social History: Foot and ankle history:     Focused Lower Extremity Physical Exam:      Neurovascular examination:    Dorsalis Pedis palpable bilateral.  Posterior tibialis palpable bilateral.    Capillary Refill Time:  Immediate return  Hair growth:  Symmetrical and bilateral   Skin:  Not atrophic  Edema: Mild edema bilateral feet. Mild edema bilateral ankles.   Neurologic:  Light touch diminished bilateral.  Warm to coolness bilateral distal toes  Decreased epicritic sensation     Musculoskeletal/ Orthopedic examination:    Equinis: present bilateral  Dorsiflexion, plantarflexion, inversion, eversion bilateral 5 out of 5 muscle strength  Wiggling toes  Negative \[facility] [place] [facility]\""ns    Dermatology examination:      Dry flaky skin plantar heels bilateral.  No open skin lesions or abrasions. Assessment and Plan:  Shefali was seen today for diabetes. Diagnoses and all orders for this visit:    Tinea unguium    Type 2 diabetes mellitus without complication, unspecified whether long term insulin use (HCC)    Hereditary sensory neuropathy    Xerosis cutis    Other orders  -     ammonium lactate (LAC-HYDRIN) 12 % lotion; Apply topically twice daily          Diabetic foot and ankle examination performed today  Recent hemoglobin A1c 5/24/2021 8.4. Does have upcoming appointment primary care physician Dr. Prince Figueroa appreciate his input going forward. Ammonium lactate 12% twice daily on bottom both feet. Avoid barefoot. Follow-up 6 months diabetic foot ankle exam    Return in about 6 months (around 12/14/2021). Seen By:  Sam Doan DPM      Document was created using voice recognition software. Note was reviewed however may contain grammatical errors.

## 2021-06-28 ENCOUNTER — OFFICE VISIT (OUTPATIENT)
Dept: PRIMARY CARE CLINIC | Age: 61
End: 2021-06-28
Payer: COMMERCIAL

## 2021-06-28 VITALS
BODY MASS INDEX: 31.78 KG/M2 | HEIGHT: 70 IN | TEMPERATURE: 96.3 F | WEIGHT: 222 LBS | DIASTOLIC BLOOD PRESSURE: 72 MMHG | OXYGEN SATURATION: 98 % | SYSTOLIC BLOOD PRESSURE: 122 MMHG | HEART RATE: 74 BPM | RESPIRATION RATE: 18 BRPM

## 2021-06-28 DIAGNOSIS — N52.9 ERECTILE DYSFUNCTION, UNSPECIFIED ERECTILE DYSFUNCTION TYPE: ICD-10-CM

## 2021-06-28 DIAGNOSIS — E78.2 MIXED HYPERLIPIDEMIA: ICD-10-CM

## 2021-06-28 DIAGNOSIS — R42 DIZZINESS: ICD-10-CM

## 2021-06-28 DIAGNOSIS — E11.69 TYPE 2 DIABETES MELLITUS WITH OTHER SPECIFIED COMPLICATION, WITHOUT LONG-TERM CURRENT USE OF INSULIN (HCC): ICD-10-CM

## 2021-06-28 DIAGNOSIS — Z00.01 ENCOUNTER FOR ROUTINE ADULT PHYSICAL EXAM WITH ABNORMAL FINDINGS: Primary | ICD-10-CM

## 2021-06-28 DIAGNOSIS — G56.03 BILATERAL CARPAL TUNNEL SYNDROME: ICD-10-CM

## 2021-06-28 DIAGNOSIS — E55.9 VITAMIN D DEFICIENCY: ICD-10-CM

## 2021-06-28 DIAGNOSIS — E66.09 CLASS 1 OBESITY DUE TO EXCESS CALORIES WITH SERIOUS COMORBIDITY AND BODY MASS INDEX (BMI) OF 31.0 TO 31.9 IN ADULT: ICD-10-CM

## 2021-06-28 DIAGNOSIS — M50.30 DDD (DEGENERATIVE DISC DISEASE), CERVICAL: ICD-10-CM

## 2021-06-28 PROCEDURE — 99396 PREV VISIT EST AGE 40-64: CPT | Performed by: FAMILY MEDICINE

## 2021-06-28 ASSESSMENT — ENCOUNTER SYMPTOMS
NAUSEA: 0
RHINORRHEA: 0
DIARRHEA: 0
SHORTNESS OF BREATH: 0
ABDOMINAL PAIN: 0
SORE THROAT: 0
CONSTIPATION: 0
WHEEZING: 0
VOMITING: 0

## 2021-06-28 NOTE — PROGRESS NOTES
2021     Chief Complaint   Patient presents with    Annual Exam     HPI  Carmella Ruiz (:  1960) is a 57 y.o. male, here for evaluation of the following medical concerns:    Patient is a 69-year-old male who has a past medical history of type II diabetes, Ed, HLD who presents to f/u his chronic medical issues and for annual physical/wellness exam.      Trigger Finger, Pain, Swelling in hands. Report numbness. Patient had EMG/MCV ordered and completed after his last office appointment. Patient has findings of right severe carpal tunnel syndrome, moderate to severe left carpal tunnel syndrome, and moderate to severe left cubital tunnel syndrome. Testing also suggest peripheral polyneuropathy. Patient was sent to orthopedic surgery for this issue. Steroid injections were completed. Patient may be a surgical candidate.     Neck/Shoulder pain issues: Pain worsening over the past year. Radicular pain in to his neck and arms. Mainly on the right. Stable currently.     Dizziness: Random. Brief. Movement doesn't seem to trigger such. BG have been elevated. No LOC. EKG at last appointment was sinus rhythm. Patient does have degenerative disc disease in the cervical spine. Patient reports that since his last office appointment this has resolved.     DM: Patient has not been taking his GLP-1 antagonist medication due to cost issues. Blood sugars have been elevated. Patient has seen podiatry for diabetic foot exam.  Reports lower extremity neuropathy.     History of hyperlipidemia: Patient was on Triglide 160 mg daily. Reports he has been off his medication for an extended period of time.     Erectile dysfunction: Tried Viagra without benefit. Trial of Cialis was performed at last appointment. Patient yet to take this medication.     Labs: CMP within normal limits besides elevated glucose, CBC essentially within normal limits. Total cholesterol 155, triglycerides 183, HDL 40, LDL 78. Vitamin D 54. Hemoglobin A1c 8.4%. CRP 0.3. JADE negative. Rheumatoid factor negative. BMI 31.85     Health maintenance: Pneumonia vaccine, shingles vaccine, Covid vaccine. Review of Systems   Constitutional: Negative for chills and fever. HENT: Negative for congestion, rhinorrhea and sore throat. Respiratory: Negative for shortness of breath and wheezing. Cardiovascular: Negative for chest pain and leg swelling. Gastrointestinal: Negative for abdominal pain, constipation, diarrhea, nausea and vomiting. Skin: Negative for rash. Neurological: Negative for light-headedness and headaches. Past Medical History:   Diagnosis Date    Diabetes mellitus type 2 in obese ([facility] [address]     Diverticulitis     Hyperlipidemia        Prior to Visit Medications    Medication Sig Taking? Authorizing Provider   Dulaglutide 1.5 MG/0.5ML SOPN Inject 1.5 mg into the skin every 7 days Yes Fernanda Land MD   ammonium lactate (LAC-HYDRIN) 12 % lotion Apply topically twice daily Yes Mary Vargas DPM   glimepiride (AMARYL) 4 MG tablet Take 1 tablet with breakfast and 1 with supper Yes Fernanda Land MD   metFORMIN (GLUCOPHAGE-XR) 500 MG extended release tablet Take 2 tablets in the am and 2 in the pm Yes Fernanda Land MD        No Known Allergies    Social History     Tobacco Use    Smoking status: Current Every Day Smoker     Packs/day: 0.25     Years: 43.00     Pack years: 10.75     Start date: 1977    Smokeless tobacco: Never Used   Substance Use Topics    Alcohol use: Not Currently           Vitals:    06/28/21 0911   BP: 122/72   Pulse: 74   Resp: 18   Temp: 96.3 °F (35.7 °C)   TempSrc: Temporal   SpO2: 98%   Weight: 222 lb (100.7 kg)   Height: 5' 10\" (1.778 m)     Estimated body mass index is 31.85 kg/m² as calculated from the following:    Height as of this encounter: 5' 10\" (1.778 m). Weight as of this encounter: 222 lb (100.7 kg). Physical Exam  Constitutional:       Appearance: He is well-developed. Comprehensive Metabolic Panel; Future  -     Hemoglobin A1C; Future    DDD (degenerative disc disease), cervical  Stable symptoms. Consider referral to physical therapy in the near future. Negative autoimmune work-up. Bilateral carpal tunnel syndrome  Following with orthopedic surgery. Status post bilateral carpal tunnel steroid injection. Patient may be a candidate for surgery in the near future. Mixed hyperlipidemia  -     Lipid Panel; Future  Mild elevation in triglycerides. No need for lipid-lowering therapy at this time. Repeat lipid panel in 4 months. Lifestyle modifications reviewed and advised. Class 1 obesity due to excess calories with serious comorbidity and body mass index (BMI) of 31.0 to 31.9 in adult  Lifestyle modifications reviewed and advised. Erectile dysfunction, unspecified erectile dysfunction type  Patient was prescribed Cialis at his last office appointment. Patient is yet to try this medication. Patient to call with any issues. Patient has failed a trial of Viagra, ineffective. Dizziness  Resolved after his last office appointment. Continue to follow. Follow-up as needed. Vitamin D deficiency  Vitamin D level appropriate continue current supplementation. HM: Pneumonia vaccine, Covid vaccine, shingles vaccine. Annual physical exam completed. Follow-up as noted below for his chronic medical problems and repeat labs. Return in about 4 months (around 10/28/2021) for To Discuss Labs Results, Routine Follow-up of Chronic Conditions. An [employer]ature was used to authenticate this note.     --Fernanda Land MD on 6/28/21 at 9:20 AM EDT

## 2021-07-08 ENCOUNTER — OFFICE VISIT (OUTPATIENT)
Dept: ORTHOPEDIC SURGERY | Age: 61
End: 2021-07-08
Payer: COMMERCIAL

## 2021-07-08 VITALS — HEIGHT: 70 IN | WEIGHT: 222 LBS | BODY MASS INDEX: 31.78 KG/M2

## 2021-07-08 DIAGNOSIS — E11.69 TYPE 2 DIABETES MELLITUS WITH OTHER SPECIFIED COMPLICATION, WITHOUT LONG-TERM CURRENT USE OF INSULIN (HCC): ICD-10-CM

## 2021-07-08 DIAGNOSIS — G56.03 BILATERAL CARPAL TUNNEL SYNDROME: Primary | ICD-10-CM

## 2021-07-08 PROCEDURE — 3017F COLORECTAL CA SCREEN DOC REV: CPT | Performed by: ORTHOPAEDIC SURGERY

## 2021-07-08 PROCEDURE — 99212 OFFICE O/P EST SF 10 MIN: CPT | Performed by: ORTHOPAEDIC SURGERY

## 2021-07-08 PROCEDURE — 4004F PT TOBACCO SCREEN RCVD TLK: CPT | Performed by: ORTHOPAEDIC SURGERY

## 2021-07-08 PROCEDURE — G8417 CALC BMI ABV UP PARAM F/U: HCPCS | Performed by: ORTHOPAEDIC SURGERY

## 2021-07-08 PROCEDURE — G8427 DOCREV CUR MEDS BY ELIG CLIN: HCPCS | Performed by: ORTHOPAEDIC SURGERY

## 2021-07-08 RX ORDER — METFORMIN HYDROCHLORIDE 500 MG/1
TABLET, EXTENDED RELEASE ORAL
Qty: 120 TABLET | Refills: 3 | Status: SHIPPED
Start: 2021-07-08 | End: 2021-08-16 | Stop reason: SDUPTHER

## 2021-07-09 NOTE — PROGRESS NOTES
Chief Complaint:   Chief Complaint   Patient presents with    Carpal Tunnel     FU Bilateral CTS. Injections given 6/10/2021 worked well. Pain and tingling much better. Beginning to feel slight recurrence of numbness both hands. Dinorah Lucas reports that bilateral hand numbness and pain were dramatically relieved very quickly and virtually completely following the injections about a month ago into the carpal tunnels, symptoms are just now beginning to return, he continues to enjoy improvement in functions including daily activities as well as golf. He has no other joint complaints at this time. Allergies; medications; past medical, surgical, family, and social history; and problem list have been reviewed today and updated as indicated in this encounter seen below. Exam: Bilateral hands continue to show no intrinsic or thenar atrophy,  strength within normal limits no objective sensory deficits. Tinel's and Phalen's negative today. Radiographs: Deferred benign clinical exam.    Florence Lipisak was seen today for carpal tunnel. Diagnoses and all orders for this visit:    Bilateral carpal tunnel syndrome       Patient was advised that his response to the injections would strongly indicate the diagnosis of carpal tunnel syndrome and offer him a strong positive prognosis should he consider ultimate surgical release. At this point he is doing well enough and would like to get to the golf season, he will continue activities to tolerance, we talked about the surgical procedure should he decide to pursue that including expected risk benefits and probable outcomes, questions asked and answered follow-up probably later this year for staged carpal tunnel release. Return if symptoms worsen or fail to improve.      Current Outpatient Medications   Medication Sig Dispense Refill    metFORMIN (GLUCOPHAGE-XR) 500 MG extended release tablet Take 2 tablets in the am and 2 in the pm 120 tablet 3    Dulaglutide 1.5 MG/0.5ML SOPN Inject 1.5 mg into the skin every 7 days 4 pen 5    ammonium lactate (LAC-HYDRIN) 12 % lotion Apply topically twice daily 400 g 2    glimepiride (AMARYL) 4 MG tablet Take 1 tablet with breakfast and 1 with supper 180 tablet 1     No current facility-administered medications for this visit. Patient Active Problem List   Diagnosis    Diabetes mellitus ([facility]    Mixed hyperlipidemia    Erectile dysfunction    Trigger finger, unspecified finger    Bilateral rotator cuff dysfunction       Past Medical History:   Diagnosis Date    Diabetes mellitus type 2 in obese ([facility] [facility]     Diverticulitis     Hyperlipidemia        History reviewed. No pertinent surgical history. No Known Allergies    Social History     Socioeconomic History    Marital status: Single     Spouse name: None    Number of children: None    Years of education: None    Highest education level: None   Occupational History    None   Tobacco Use    Smoking status: Current Every Day Smoker     Packs/day: 0.25     Years: 43.00     Pack years: 10.75     Start date: 1977    Smokeless tobacco: Never Used   Substance and Sexual Activity    Alcohol use: Not Currently    Drug use: Never    Sexual activity: None   Other Topics Concern    None   Social History Narrative    None     Social Determinants of Health     Financial Resource Strain:     Difficulty of Paying Living Expenses:    Food Insecurity:     Worried About Running Out of Food in the Last Year:     Ran Out of Food in the Last Year:    Transportation Needs:     Lack of Transportation (Medical):      Lack of Transportation (Non-Medical):    Physical Activity:     Days of Exercise per Week:     Minutes of Exercise per Session:    Stress:     Feeling of Stress :    Social Connections:     Frequency of Communication with Friends and Family:     Frequency of Social Gatherings with Friends and Family:     Attends Protestant Services:     Active Member of [address] [place] [address] or Organizations:     Attends Club or Organization Meetings:     Marital Status:    Intimate Partner Violence:     Fear of Current or Ex-Partner:     Emotionally Abused:     Physically Abused:     Sexually Abused:        Family History   Problem Relation Age of Onset    Stroke Mother     Diabetes Mother     Heart Attack Mother     Cancer Father         leukemia    Cancer Sister         leukemia         Review of Systems  As follows except as previously noted in HPI:  Constitutional: Negative for chills, diaphoresis, fatigue, fever and unexpected weight change. Respiratory: Negative for cough, shortness of breath and wheezing. Cardiovascular: Negative for chest pain and palpitations. Neurological: Negative for dizziness, syncope, cephalgia. GI / : negative  Musculoskeletal: see HPI       Objective:   Physical Exam   Constitutional: Oriented to person, place, and time. and appears well-developed and well-nourished. :   Head: Normocephalic and atraumatic. Eyes: EOM are normal.   Neck: Neck supple. Cardiovascular: Normal rate and regular rhythm. Pulmonary/Chest: Effort normal. No stridor. No respiratory distress, no wheezes. Abdominal:  No abnormal distension. Neurological: Alert and oriented to person, place, and time. Skin: Skin is warm and dry. Psychiatric: Normal mood and affect.  Behavior is normal. Thought content normal.    7/9/2021  7:46 AM

## 2021-08-16 DIAGNOSIS — E11.69 TYPE 2 DIABETES MELLITUS WITH OTHER SPECIFIED COMPLICATION, WITHOUT LONG-TERM CURRENT USE OF INSULIN (HCC): ICD-10-CM

## 2021-08-16 RX ORDER — METFORMIN HYDROCHLORIDE 500 MG/1
TABLET, EXTENDED RELEASE ORAL
Qty: 120 TABLET | Refills: 0 | Status: SHIPPED
Start: 2021-08-16 | End: 2021-09-27 | Stop reason: ALTCHOICE

## 2021-09-26 ENCOUNTER — HOSPITAL ENCOUNTER (EMERGENCY)
Age: 61
Discharge: HOME | End: 2021-09-27
Attending: EMERGENCY MEDICINE
Payer: COMMERCIAL

## 2021-09-26 ENCOUNTER — APPOINTMENT (OUTPATIENT)
Dept: MRI IMAGING | Age: 61
End: 2021-09-26
Payer: COMMERCIAL

## 2021-09-26 ENCOUNTER — APPOINTMENT (OUTPATIENT)
Dept: CT IMAGING | Age: 61
End: 2021-09-26
Payer: COMMERCIAL

## 2021-09-26 DIAGNOSIS — R29.90 STROKE-LIKE SYMPTOMS: Primary | ICD-10-CM

## 2021-09-26 LAB
ANION GAP SERPL CALCULATED.3IONS-SCNC: 12 MMOL/L (ref 7–16)
BUN BLDV-MCNC: 10 MG/DL (ref 6–23)
CALCIUM SERPL-MCNC: 8.8 MG/DL (ref 8.6–10.2)
CHLORIDE BLD-SCNC: 101 MMOL/L (ref 98–107)
CHP ED QC CHECK: YES
CO2: 23 MMOL/L (ref 22–29)
CREAT SERPL-MCNC: 0.5 MG/DL (ref 0.7–1.2)
GFR AFRICAN AMERICAN: >60
GFR NON-AFRICAN AMERICAN: >60 ML/MIN/1.73
GLUCOSE BLD-MCNC: 245 MG/DL
GLUCOSE BLD-MCNC: 261 MG/DL (ref 74–99)
HCT VFR BLD CALC: 43.3 % (ref 37–54)
HEMOGLOBIN: 15 G/DL (ref 12.5–16.5)
MCH RBC QN AUTO: 32.6 PG (ref 26–35)
MCHC RBC AUTO-ENTMCNC: 34.6 % (ref 32–34.5)
MCV RBC AUTO: 94.1 FL (ref 80–99.9)
METER GLUCOSE: 245 MG/DL (ref 74–99)
PDW BLD-RTO: 12.2 FL (ref 11.5–15)
PLATELET # BLD: 221 E9/L (ref 130–450)
PMV BLD AUTO: 9.4 FL (ref 7–12)
POTASSIUM SERPL-SCNC: 3.9 MMOL/L (ref 3.5–5)
RBC # BLD: 4.6 E12/L (ref 3.8–5.8)
SODIUM BLD-SCNC: 136 MMOL/L (ref 132–146)
TROPONIN, HIGH SENSITIVITY: 12 NG/L (ref 0–11)
WBC # BLD: 7.8 E9/L (ref 4.5–11.5)

## 2021-09-26 PROCEDURE — 80048 BASIC METABOLIC PNL TOTAL CA: CPT

## 2021-09-26 PROCEDURE — 6360000004 HC RX CONTRAST MEDICATION: Performed by: RADIOLOGY

## 2021-09-26 PROCEDURE — 0042T CT BRAIN PERFUSION: CPT

## 2021-09-26 PROCEDURE — 82962 GLUCOSE BLOOD TEST: CPT

## 2021-09-26 PROCEDURE — 70551 MRI BRAIN STEM W/O DYE: CPT

## 2021-09-26 PROCEDURE — 85027 COMPLETE CBC AUTOMATED: CPT

## 2021-09-26 PROCEDURE — 99284 EMERGENCY DEPT VISIT MOD MDM: CPT

## 2021-09-26 PROCEDURE — 99448 NTRPROF PH1/NTRNET/EHR 21-30: CPT | Performed by: PSYCHIATRY & NEUROLOGY

## 2021-09-26 PROCEDURE — 70498 CT ANGIOGRAPHY NECK: CPT

## 2021-09-26 PROCEDURE — 84484 ASSAY OF TROPONIN QUANT: CPT

## 2021-09-26 PROCEDURE — 70450 CT HEAD/BRAIN W/O DYE: CPT

## 2021-09-26 PROCEDURE — 93005 ELECTROCARDIOGRAM TRACING: CPT | Performed by: EMERGENCY MEDICINE

## 2021-09-26 PROCEDURE — 70496 CT ANGIOGRAPHY HEAD: CPT

## 2021-09-26 RX ADMIN — IOPAMIDOL 100 ML: 755 INJECTION, SOLUTION INTRAVENOUS at 14:40

## 2021-09-26 ASSESSMENT — ENCOUNTER SYMPTOMS
SHORTNESS OF BREATH: 0
NAUSEA: 0
DIARRHEA: 0
ABDOMINAL PAIN: 0
RHINORRHEA: 0
COUGH: 0
VOMITING: 0
ABDOMINAL DISTENTION: 0
CHEST TIGHTNESS: 0

## 2021-09-26 NOTE — ED PROVIDER NOTES
RAÚL Myers is a 59-year-old male presenting to the emergency room after having an episode of blurry vision, dizziness, ataxia, and sudden headache onset while playing golf. Patient states at 12:00, he was playing golf with his friends when all of a sudden he could not really ambulate and sat in the cart. Patient states that he was feeling really nauseous. He has not had pain mainly located at the back of his head 6 out of 10, non-radiating. Patient states that he had an Alieve thinking it would help the pain, he is a diabetic and thought that it could be his sugars so he had a candy bar and a redbull. Patient denies having any alcohol today. Patient denies having any loss of consciousness falls changes in bowels or urination, however he states he has new blurry vision. Patient has a pertinent medical history of diabetes and hyperlipidemia. She denies having any strokes, vascular diseases, congestive heart failure. Patient denies having any numbness or tingling, loss of movement. Review of Systems   Constitutional: Negative for chills, fatigue and fever. HENT: Negative for rhinorrhea. Eyes: Positive for visual disturbance. Respiratory: Negative for cough, chest tightness and shortness of breath. Cardiovascular: Negative for chest pain, palpitations and leg swelling. Gastrointestinal: Negative for abdominal distention, abdominal pain, diarrhea, nausea and vomiting. Genitourinary: Negative for difficulty urinating. Musculoskeletal: Negative for arthralgias. Neurological: Positive for light-headedness. Negative for speech difficulty, numbness and headaches. Physical Exam  Vitals reviewed. Constitutional:       Appearance: Normal appearance. HENT:      Head: Normocephalic and atraumatic. Nose: Nose normal.      Mouth/Throat:      Mouth: Mucous membranes are moist.   Eyes:      Extraocular Movements: Extraocular movements intact.       Pupils: Pupils are equal, round, and reactive to light. Neck:      Comments: Posterior neck tender to palpation. Cardiovascular:      Rate and Rhythm: Normal rate and regular rhythm. Pulses: Normal pulses. Heart sounds: Normal heart sounds. Pulmonary:      Effort: Pulmonary effort is normal.      Breath sounds: Normal breath sounds. Abdominal:      General: Bowel sounds are normal. There is no distension. Palpations: Abdomen is soft. There is no mass. Tenderness: There is no abdominal tenderness. There is no guarding. Hernia: No hernia is present. Musculoskeletal:      Cervical back: Normal range of motion and neck supple. No tenderness. Skin:     Capillary Refill: Capillary refill takes 2 to 3 seconds. Neurological:      Mental Status: He is alert and oriented to person, place, and time. GCS: GCS eye subscore is 4. GCS verbal subscore is 5. GCS motor subscore is 6. Cranial Nerves: Cranial nerves are intact. Sensory: Sensation is intact. Motor: No tremor, abnormal muscle tone or pronator drift. Coordination: Finger-Nose-Finger Test abnormal. Heel to Shin Test normal.   Psychiatric:         Mood and Affect: Mood normal.       Finger to nose on the right side. Procedures   None     MDM  Number of Diagnoses or Management Options  Stroke-like symptoms  Diagnosis management comments: Steph Stoll is a 40-year-old male being evaluated for possible stroke. Patient arrived, he was able to walk, patient describes an episode around 12:00 when he was golfing and all of a sudden he had a headache associated with abdominal discomfort and nausea blurry vision. Patient had to sit down. BMP, CBC, glucose, and troponin has been ordered. CT brain perfusion CT head without contrast CT head with contrast and CTA neck with contrast have all been ordered and will be read and reviewed for further management. EKG has been ordered.   Patient has imaging has been reviewed no acute process, no acute intracranial hemorrhage or edema. The case was discussed with Dr. Pavithra Tadeo in neurology and Dr. Luis Galdamez, patient will get a MRI of for further imaging and then transferred to [facility]. NIH Stroke Scale/Score at time of initial evaluation:  1A: Level of Consciousness 0 - alert; keenly responsive   1B: Ask Month and Age 0 - answers both questions correctly   1C: Tell Patient To Open and Close Eyes, then Hand  Squeeze 0 - performs both tasks correctly   2: Test Horizontal Extraocular Movements 0 - normal   3: Test Visual Fields 1 - partial hemianopia   4: Test Facial Palsy 0 - normal symmetric movement   5A: Test Left Arm Motor Drift 0 - no drift, limb holds 90 (or 45) degrees for full 10 seconds   5B: Test Right Arm Motor Drift 0 - no drift, limb holds 90 (or 45) degrees for full 10 seconds   6A: Test Left Leg Motor Drift 0 - no drift; leg holds 30 degree position for full 5 seconds   6B: Test Right Leg Motor Drift 0 - no drift; leg holds 30 degree position for full 5 seconds   7: Test Limb Ataxia   (FNF/Heel-Shin) 1 - present in one limb   8: Test Sensation 0 - normal; no sensory loss   9: Test Language/Aphasia 0 - no aphasia, normal   10: Test Dysarthria 0 - normal   11: Test Extinction/Inattention 0 - no abnormality   Total Score: 2   9/26/21 at 2:32 PM EDT. Acute CVA Core Measures:      - t-PA Eligibility: IV t-PA was considered and not given due to violations in inclusion criteria including mild/rapidly resolving deficit   - The case has been discussed with , they agree this patient is NOT t-PA eligible. [id number]  Atrial Fibrillation Risk Score Calculator for Non-Rheumatic AF     Condition Points   C    Congestive Heart Failure no (0)   H    Hypertension:  blood pressure consistently above 140/90 mmHg (or treated     hypertension on medication)   no (0)   A    Age ? 75 years no    A    Age 65-74 years no (0)   D    Diabetes Mellitus yes (1)   S    Prior Stroke / TIA or Thromboembolism no and yes (2)   V    Vascular Disease no (0)   S    Sex Female no (0)        TOTAL POINTS 0     Annual Stroke Risk:  According to the 2012 ESC Guideline Updates    CHADS2 Score Stroke Risk %   0 0.78   1 2   2 3.7   3 5.9   4 9.3   5 15.3   6 19.7                       7                                     21.5                                 8 22.4   9 23.6     ED Course as of Sep 26 1621   Sun Sep 26, 2021   1610 EKG: This EKG is signed and interpreted by the EP. Time: 16:00  Rate: 56  Rhythm: Sinus  Interpretation: sinus bradycardia  Comparison: stable as compared to patient's most recent EKG      [CF]      ED Course User Index  [CF] Chris Baird DO       --------------------------------------------- PAST HISTORY ---------------------------------------------  Past Medical History:  has a past medical history of Diabetes mellitus type 2 in obese ([address] Diverticulitis, and Hyperlipidemia. Past Surgical History:  has no past surgical history on file. Social History:  reports that he has been smoking. He started smoking about 44 years ago. He has a 10.75 pack-year smoking history. He has never used smokeless tobacco. He reports previous alcohol use. He reports that he does not use drugs. Family History: family history includes Cancer in his father and sister; Diabetes in his mother; Heart Attack in his mother; Stroke in his mother. The patients home medications have been reviewed. Allergies: Patient has no known allergies.     -------------------------------------------------- RESULTS -------------------------------------------------    LABS:  Results for orders placed or performed during the hospital encounter of 09/26/21   Troponin   Result Value Ref Range    Troponin, High Sensitivity 12 (H) 0 - 11 ng/L   CBC   Result Value Ref Range    WBC 7.8 4.5 - 11.5 E9/L    RBC 4.60 3.80 - 5.80 E12/L    Hemoglobin 15.0 12.5 - 16.5 g/dL    Hematocrit 43.3 37.0 - 54.0 %    MCV 94.1 80.0 - 99.9 fL    MCH 32.6 26.0 - 35.0 pg    MCHC 34.6 (H) 32.0 - 34.5 %    RDW 12.2 11.5 - 15.0 fL    Platelets 281 962 - 551 E9/L    MPV 9.4 7.0 - 12.0 fL   Basic Metabolic Panel   Result Value Ref Range    Sodium 136 132 - 146 mmol/L    Potassium 3.9 3.5 - 5.0 mmol/L    Chloride 101 98 - 107 mmol/L    CO2 23 22 - 29 mmol/L    Anion Gap 12 7 - 16 mmol/L    Glucose 261 (H) 74 - 99 mg/dL    BUN 10 6 - 23 mg/dL    CREATININE 0.5 (L) 0.7 - 1.2 mg/dL    GFR Non-African American >60 >=60 mL/min/1.73    GFR African American >60     Calcium 8.8 8.6 - 10.2 mg/dL   POCT Glucose   Result Value Ref Range    Meter Glucose 245 (H) 74 - 99 mg/dL       RADIOLOGY:  CTA HEAD W CONTRAST   Final Result   1. Unenhanced CT shows no acute intracranial hemorrhage or edema. 2. No intracranial arterial stenosis. 3.  No stenosis involving proximal or distal cervical internal carotid   arteries or vertebral arteries. 4. No evidence of stroke on CT brain perfusion. If clinical concern persists   for acute stroke, MRI brain with diffusion-weighted imaging could be helpful   for further evaluation. CT BRAIN PERFUSION   Final Result   1. Unenhanced CT shows no acute intracranial hemorrhage or edema. 2. No intracranial arterial stenosis. 3.  No stenosis involving proximal or distal cervical internal carotid   arteries or vertebral arteries. 4. No evidence of stroke on CT brain perfusion. If clinical concern persists   for acute stroke, MRI brain with diffusion-weighted imaging could be helpful   for further evaluation. CTA NECK W CONTRAST   Final Result   1. Unenhanced CT shows no acute intracranial hemorrhage or edema. 2. No intracranial arterial stenosis. 3.  No stenosis involving proximal or distal cervical internal carotid   arteries or vertebral arteries. 4. No evidence of stroke on CT brain perfusion.  If clinical concern persists   for acute stroke, MRI brain with diffusion-weighted imaging could be helpful   for further evaluation. CT HEAD WO CONTRAST   Final Result   1. Unenhanced CT shows no acute intracranial hemorrhage or edema. 2. No intracranial arterial stenosis. 3.  No stenosis involving proximal or distal cervical internal carotid   arteries or vertebral arteries. 4. No evidence of stroke on CT brain perfusion. If clinical concern persists   for acute stroke, MRI brain with diffusion-weighted imaging could be helpful   for further evaluation. MRA HEAD W WO CONTARST    (Results Pending)     ------------------------- NURSING NOTES AND VITALS REVIEWED ---------------------------  Date / Time Roomed:  9/26/2021  1:51 PM  ED Bed Assignment:  [id number]    The nursing notes within the ED encounter and vital signs as below have been reviewed. Patient Vitals for the past 24 hrs:   BP Temp Pulse Resp SpO2   09/26/21 1533 125/68 97.7 °F (36.5 °C) 61 16 92 %       Oxygen Saturation Interpretation: Normal    ------------------------------------------ PROGRESS NOTES ------------------------------------------  Re-evaluation(s):  Time: 3:15  Patients symptoms are improving  Repeat physical examination is improved    Counseling:  I have spoken with the patient and discussed todays results, in addition to providing specific details for the plan of care and counseling regarding the diagnosis and prognosis. Their questions are answered at this time and they are agreeable with the plan of admission.    --------------------------------- ADDITIONAL PROVIDER NOTES ---------------------------------  Consultations:  Neurology  Time: 3;45. Spoke with . Discussed case. They will admit the patient.   This patient's ED course included: a personal history and physicial examination, re-evaluation prior to disposition, multiple bedside re-evaluations and complex medical decision making and emergency management    This patient has remained hemodynamically stable during their ED course. Diagnosis:  1. Stroke-like symptoms        Disposition:  Patient's disposition: Admit to [address] [place] [address]  Patient's condition is stable.          Roy Snellen, MD  Resident  09/26/21 [id number]       Roy Snellen, MD  Resident  09/26/21 [id number]

## 2021-09-26 NOTE — ED NOTES
Please call patients contact in demographics after transfer to [facility] Jaycob Hurst RN  09/26/21 2155

## 2021-09-26 NOTE — VIRTUAL HEALTH
Consults  Patient Location:  [id number] [facility] Emergency Department    Provider Location ([facility]/State): [address] [place] [address] Stroke and Vascular Neurology Consult for  [address] Stroke Alert through [address] @   9/26/2021 3:46 PM  Pt Name: Barbra Robertson  MRN: [id number]  YOB: 1960  Date of evaluation: 9/26/2021  Primary Care Physician: Patricio Matthew MD  Reason for Evaluation: Stroke evaluation with Phone Consult, Discussion and Review of imaging    Barbra Robertson is a 57 y.o. male with past medical history significant for diabetes type 2, dyslipidemia presented with a transient episode of neck pain, gait disturbance, dizziness while he was playing golf. Patient stated that his symptoms started while he was golfing at around 12 PM.  Patient initially thought that symptoms are secondary to hypoglycemia. After having some p.o. intake, patient symptoms significantly improved. However in ER, patient continued to have visual field deficit and right-sided dysmetria per ER physician. Therefore, telestroke service was consulted for further evaluation. Stroke alert was not called, since patient had rapidly improved symptoms. LKW: 12:00 pm  NIH:  2    Allergies  has No Known Allergies. Medications  Prior to Admission medications    Medication Sig Start Date End Date Taking?  Authorizing Provider   metFORMIN (GLUCOPHAGE-XR) 500 MG extended release tablet Take 2 tablets in the am and 2 in the pm 8/16/21   Justo Mabry MD   Dulaglutide 1.5 MG/0.5ML SOPN Inject 1.5 mg into the skin every 7 days 6/28/21   Patricio Matthew MD   ammonium lactate (LAC-HYDRIN) 12 % lotion Apply topically twice daily 6/14/21   Shannon Saavedra DPM   glimepiride (AMARYL) 4 MG tablet Take 1 tablet with breakfast and 1 with supper 6/11/21   Patricio Matthew MD    Scheduled Meds:  Continuous Infusions:  PRN Meds:.  Past Medical History   has a past medical history of Diabetes mellitus type 2 in obese [facility]), Diverticulitis, and Hyperlipidemia. Social History  Social History     Socioeconomic History    Marital status: Single     Spouse name: Not on file    Number of children: Not on file    Years of education: Not on file    Highest education level: Not on file   Occupational History    Not on file   Tobacco Use    Smoking status: Current Every Day Smoker     Packs/day: 0.25     Years: 43.00     Pack years: 10.75     Start date: 1977    Smokeless tobacco: Never Used   Substance and Sexual Activity    Alcohol use: Not Currently    Drug use: Never    Sexual activity: Not on file   Other Topics Concern    Not on file   Social History Narrative    Not on file     Social Determinants of Health     Financial Resource Strain:     Difficulty of Paying Living Expenses:    Food Insecurity:     Worried About [address] in the Last Year:     [address] in the Last Year:    Transportation Needs:     Lack of Transportation (Medical):      Lack of Transportation (Non-Medical):    Physical Activity:     Days of Exercise per Week:     Minutes of Exercise per Session:    Stress:     Feeling of Stress :    Social Connections:     Frequency of Communication with Friends and Family:     Frequency of Social Gatherings with Friends and Family:     Attends Hindu Services:     Active Member of Clubs or Organizations:     Attends Club or Organization Meetings:     Marital Status:    Intimate Partner Violence:     Fear of Current or Ex-Partner:     Emotionally Abused:     Physically Abused:     Sexually Abused:      Family History      Problem Relation Age of Onset    Stroke Mother     Diabetes Mother     Heart Attack Mother     Cancer Father         leukemia    Cancer Sister         leukemia       OBJECTIVE  /68   Pulse 61   Temp 97.7 °F (36.5 °C)   Resp 16   SpO2 92%     NIH Stroke Scale  Interval: Baseline  Level of Consciousness (1a. ): Alert  LOC Questions (1b. ): Answers both correctly  LOC Commands (1c. ): Performs both tasks correctly  Best Gaze (2. ): Normal  Visual (3. ): No visual loss  Facial Palsy (4. ): Normal symmetrical movement  Motor Arm, Left (5a. ): No drift  Motor Arm, Right (5b. ): No drift  Motor Leg, Left (6a. ): No drift  Motor Leg, Right (6b. ): No drift  Limb Ataxia (7. ): Absent  Sensory (8. ): Normal  Best Language (9. ): No aphasia  Dysarthria (10. ): Normal  Extinction and Inattention (11): No abnormality  Total: 0  Pre-Morbid mRS: 01    Imaging:  Images were personally reviewed with PACS used to review images including:  CT brain without contrast: No evidence of any acute intracranial hemorrhage or edema or evolving infarct. CTA imaging: No evidence of any acute intracranial stenosis, stenosis involving proximal or distal cervical internal carotid arteries or vertebral arteries. No evidence of any vertebral artery dissection. Posterior circulation including bilateral intracranial vertebral arteries, basilar arteries, bilateral PCAs were intact. No evidence of any perfusion deficit. Assessment:  Mr. Gladys Chun is a 40-year-old male with past medical history significant for diabetes, dyslipidemia presented with acute onset gait disturbance, neck pain. In Er patient's NIHSS was 02 due to some visual deficits/ b/l blurred vision and right FTN abnormality. Differential DDx:  Ischemic stroke. Patient was not a tPA candidate due to rapidly improving symptoms. CTA head and neck, did not show any acute intracranial posterior circulation abnormality, vertebral artery dissection. Recommendations:  1. NIH 2  2. Recommend Inpatient Neurology Consult for further assessment and evaluation   3. Aspirin 325 mg and Atorvastatin 80 mg stat. 4.   MRI brain without contrast.  5.   Please call Teletsroke service if patient's neuro exam worsens.        Discussed with Attending Physician    At least 20 min of critical care time spent through telemedicine robot at patient bedside (via interactive/real-time software) as patient is in imminent and life threatening deterioration with further treatment and evaluation. This Virtual Visit was conducted with patient's (and/or legal guardian's) consent, to provide telestroke consultation and necessary medical care. Time spent examining patient, reviewing the images personally, reviewing the chart, perform high complexity decision making and speaking with the nursing staff regarding recommendations    This is a Phone Consult, I have not seen the patient face to face, the telemedicine device was not utilized. Vandana Roberto MD  Stroke, Neurocritical Care And/or [address] [place] [address] Stroke [id number] [address]  Electronically signed 9/26/2021 at 3:46 PM      This virtual visit was conducted via interactive/real-time audio/video.

## 2021-09-27 VITALS
BODY MASS INDEX: 31.78 KG/M2 | RESPIRATION RATE: 16 BRPM | HEIGHT: 70 IN | WEIGHT: 222 LBS | OXYGEN SATURATION: 96 % | TEMPERATURE: 98.1 F | SYSTOLIC BLOOD PRESSURE: 157 MMHG | HEART RATE: 81 BPM | DIASTOLIC BLOOD PRESSURE: 77 MMHG

## 2021-09-27 RX ORDER — METFORMIN HYDROCHLORIDE 500 MG/1
1000 TABLET, EXTENDED RELEASE ORAL 2 TIMES DAILY
COMMUNITY
End: 2021-09-30 | Stop reason: SDUPTHER

## 2021-09-27 RX ORDER — GLIMEPIRIDE 4 MG/1
4 TABLET ORAL 2 TIMES DAILY
COMMUNITY
End: 2021-10-28 | Stop reason: SDUPTHER

## 2021-09-27 RX ORDER — ATORVASTATIN CALCIUM 20 MG/1
10 TABLET, FILM COATED ORAL DAILY
Qty: 10 TABLET | Refills: 0 | Status: SHIPPED | OUTPATIENT
Start: 2021-09-27 | End: 2021-09-30 | Stop reason: SDUPTHER

## 2021-09-27 NOTE — ED NOTES
I spoke with patient and wife and they are frustrated with wait. I did speak with them and let them know that Dr Barrett Shultz was trying to review the status of this admission. Patient is ambulatory to bathroom and is eating lunch from outside source.       Gi Erazo RN  09/27/21 9293

## 2021-09-27 NOTE — ED NOTES
Assumed care of this patient at this time. Verbal report received from collette boothe.      Stephanie Ibrahim RN  09/27/21 0269

## 2021-09-27 NOTE — ED NOTES
Patient reevaluated at 1230. All symptoms are currently gone. Patient does not wish to be transferred downtown. Patient's MRI was negative. Patient will be started on aspirin and atorvastatin he will follow-up with his primary care doctor. He was discharged in stable condition.      Nilsa Chambers MD  09/27/21 9905

## 2021-09-28 LAB
EKG ATRIAL RATE: 56 BPM
EKG P AXIS: 3 DEGREES
EKG P-R INTERVAL: 154 MS
EKG Q-T INTERVAL: 446 MS
EKG QRS DURATION: 84 MS
EKG QTC CALCULATION (BAZETT): 430 MS
EKG R AXIS: 2 DEGREES
EKG T AXIS: 41 DEGREES
EKG VENTRICULAR RATE: 56 BPM

## 2021-09-28 PROCEDURE — 93010 ELECTROCARDIOGRAM REPORT: CPT | Performed by: INTERNAL MEDICINE

## 2021-09-30 ENCOUNTER — OFFICE VISIT (OUTPATIENT)
Dept: PRIMARY CARE CLINIC | Age: 61
End: 2021-09-30
Payer: COMMERCIAL

## 2021-09-30 VITALS
DIASTOLIC BLOOD PRESSURE: 70 MMHG | HEART RATE: 75 BPM | HEIGHT: 70 IN | BODY MASS INDEX: 32.27 KG/M2 | TEMPERATURE: 97.8 F | RESPIRATION RATE: 16 BRPM | SYSTOLIC BLOOD PRESSURE: 122 MMHG | OXYGEN SATURATION: 97 % | WEIGHT: 225.4 LBS

## 2021-09-30 DIAGNOSIS — N52.9 ERECTILE DYSFUNCTION, UNSPECIFIED ERECTILE DYSFUNCTION TYPE: ICD-10-CM

## 2021-09-30 DIAGNOSIS — R42 DIZZINESS: ICD-10-CM

## 2021-09-30 DIAGNOSIS — E78.2 MIXED HYPERLIPIDEMIA: ICD-10-CM

## 2021-09-30 DIAGNOSIS — R29.90 STROKE-LIKE SYMPTOMS: ICD-10-CM

## 2021-09-30 DIAGNOSIS — E55.9 VITAMIN D DEFICIENCY: ICD-10-CM

## 2021-09-30 DIAGNOSIS — Z09 HOSPITAL DISCHARGE FOLLOW-UP: ICD-10-CM

## 2021-09-30 DIAGNOSIS — E11.69 TYPE 2 DIABETES MELLITUS WITH OTHER SPECIFIED COMPLICATION, WITHOUT LONG-TERM CURRENT USE OF INSULIN (HCC): Primary | ICD-10-CM

## 2021-09-30 PROCEDURE — 4004F PT TOBACCO SCREEN RCVD TLK: CPT | Performed by: FAMILY MEDICINE

## 2021-09-30 PROCEDURE — G8417 CALC BMI ABV UP PARAM F/U: HCPCS | Performed by: FAMILY MEDICINE

## 2021-09-30 PROCEDURE — 2022F DILAT RTA XM EVC RTNOPTHY: CPT | Performed by: FAMILY MEDICINE

## 2021-09-30 PROCEDURE — 99214 OFFICE O/P EST MOD 30 MIN: CPT | Performed by: FAMILY MEDICINE

## 2021-09-30 PROCEDURE — G8427 DOCREV CUR MEDS BY ELIG CLIN: HCPCS | Performed by: FAMILY MEDICINE

## 2021-09-30 PROCEDURE — 3052F HG A1C>EQUAL 8.0%<EQUAL 9.0%: CPT | Performed by: FAMILY MEDICINE

## 2021-09-30 PROCEDURE — 1111F DSCHRG MED/CURRENT MED MERGE: CPT | Performed by: FAMILY MEDICINE

## 2021-09-30 PROCEDURE — 3017F COLORECTAL CA SCREEN DOC REV: CPT | Performed by: FAMILY MEDICINE

## 2021-09-30 RX ORDER — ATORVASTATIN CALCIUM 20 MG/1
20 TABLET, FILM COATED ORAL DAILY
Qty: 90 TABLET | Refills: 1 | Status: SHIPPED
Start: 2021-09-30 | End: 2022-02-01 | Stop reason: SDUPTHER

## 2021-09-30 RX ORDER — METFORMIN HYDROCHLORIDE 500 MG/1
1000 TABLET, EXTENDED RELEASE ORAL 2 TIMES DAILY
Qty: 360 TABLET | Refills: 1 | Status: SHIPPED
Start: 2021-09-30 | End: 2022-02-01 | Stop reason: SDUPTHER

## 2021-09-30 RX ORDER — ASPIRIN 81 MG/1
81 TABLET ORAL DAILY
Qty: 90 TABLET | Refills: 1
Start: 2021-09-30

## 2021-09-30 ASSESSMENT — ENCOUNTER SYMPTOMS
CONSTIPATION: 0
SORE THROAT: 0
DIARRHEA: 0
SHORTNESS OF BREATH: 0
NAUSEA: 0
ABDOMINAL PAIN: 0
WHEEZING: 0
RHINORRHEA: 0
VOMITING: 0

## 2021-10-11 ENCOUNTER — HOSPITAL ENCOUNTER (OUTPATIENT)
Dept: SLEEP CENTER | Age: 61
Discharge: HOME | End: 2021-10-11
Payer: COMMERCIAL

## 2021-10-11 DIAGNOSIS — Z09 HOSPITAL DISCHARGE FOLLOW-UP: ICD-10-CM

## 2021-10-11 DIAGNOSIS — R42 DIZZINESS: ICD-10-CM

## 2021-10-11 DIAGNOSIS — R29.90 STROKE-LIKE SYMPTOMS: ICD-10-CM

## 2021-10-11 DIAGNOSIS — R00.1 BRADYCARDIA: Primary | ICD-10-CM

## 2021-10-11 PROCEDURE — 93226 XTRNL ECG REC<48 HR SCAN A/R: CPT

## 2021-10-11 PROCEDURE — 93225 XTRNL ECG REC<48 HRS REC: CPT

## 2021-10-28 ENCOUNTER — OFFICE VISIT (OUTPATIENT)
Dept: PRIMARY CARE CLINIC | Age: 61
End: 2021-10-28
Payer: COMMERCIAL

## 2021-10-28 VITALS
TEMPERATURE: 97.1 F | BODY MASS INDEX: 32.78 KG/M2 | SYSTOLIC BLOOD PRESSURE: 132 MMHG | OXYGEN SATURATION: 97 % | DIASTOLIC BLOOD PRESSURE: 76 MMHG | RESPIRATION RATE: 18 BRPM | HEIGHT: 70 IN | WEIGHT: 229 LBS | HEART RATE: 84 BPM

## 2021-10-28 DIAGNOSIS — E11.69 TYPE 2 DIABETES MELLITUS WITH OTHER SPECIFIED COMPLICATION, WITHOUT LONG-TERM CURRENT USE OF INSULIN (HCC): ICD-10-CM

## 2021-10-28 DIAGNOSIS — R42 DIZZINESS: ICD-10-CM

## 2021-10-28 DIAGNOSIS — I49.1 PREMATURE ATRIAL CONTRACTION: Primary | ICD-10-CM

## 2021-10-28 DIAGNOSIS — N52.9 ERECTILE DYSFUNCTION, UNSPECIFIED ERECTILE DYSFUNCTION TYPE: ICD-10-CM

## 2021-10-28 DIAGNOSIS — E78.2 MIXED HYPERLIPIDEMIA: ICD-10-CM

## 2021-10-28 DIAGNOSIS — R29.90 STROKE-LIKE SYMPTOMS: ICD-10-CM

## 2021-10-28 PROCEDURE — 99214 OFFICE O/P EST MOD 30 MIN: CPT | Performed by: FAMILY MEDICINE

## 2021-10-28 PROCEDURE — 2022F DILAT RTA XM EVC RTNOPTHY: CPT | Performed by: FAMILY MEDICINE

## 2021-10-28 PROCEDURE — 3052F HG A1C>EQUAL 8.0%<EQUAL 9.0%: CPT | Performed by: FAMILY MEDICINE

## 2021-10-28 PROCEDURE — G8427 DOCREV CUR MEDS BY ELIG CLIN: HCPCS | Performed by: FAMILY MEDICINE

## 2021-10-28 PROCEDURE — 4004F PT TOBACCO SCREEN RCVD TLK: CPT | Performed by: FAMILY MEDICINE

## 2021-10-28 PROCEDURE — G8484 FLU IMMUNIZE NO ADMIN: HCPCS | Performed by: FAMILY MEDICINE

## 2021-10-28 PROCEDURE — 3017F COLORECTAL CA SCREEN DOC REV: CPT | Performed by: FAMILY MEDICINE

## 2021-10-28 PROCEDURE — G8417 CALC BMI ABV UP PARAM F/U: HCPCS | Performed by: FAMILY MEDICINE

## 2021-10-28 RX ORDER — GLIMEPIRIDE 4 MG/1
4 TABLET ORAL 2 TIMES DAILY
Qty: 180 TABLET | Refills: 1 | Status: SHIPPED
Start: 2021-10-28 | End: 2022-02-01 | Stop reason: SDUPTHER

## 2021-10-28 SDOH — ECONOMIC STABILITY: FOOD INSECURITY: WITHIN THE PAST 12 MONTHS, YOU WORRIED THAT YOUR FOOD WOULD RUN OUT BEFORE YOU GOT MONEY TO BUY MORE.: NEVER TRUE

## 2021-10-28 SDOH — ECONOMIC STABILITY: FOOD INSECURITY: WITHIN THE PAST 12 MONTHS, THE FOOD YOU BOUGHT JUST DIDN'T LAST AND YOU DIDN'T HAVE MONEY TO GET MORE.: NEVER TRUE

## 2021-10-28 ASSESSMENT — SOCIAL DETERMINANTS OF HEALTH (SDOH): HOW HARD IS IT FOR YOU TO PAY FOR THE VERY BASICS LIKE FOOD, HOUSING, MEDICAL CARE, AND HEATING?: NOT HARD AT ALL

## 2021-10-28 ASSESSMENT — ENCOUNTER SYMPTOMS
CONSTIPATION: 0
WHEEZING: 0
RHINORRHEA: 0
DIARRHEA: 0
NAUSEA: 0
VOMITING: 0
SORE THROAT: 0
ABDOMINAL PAIN: 0
SHORTNESS OF BREATH: 0

## 2021-10-28 NOTE — PROGRESS NOTES
10/28/2021     Chief Complaint   Patient presents with    Results     HPI  Lorna Malik (:  1960) is a 57 y.o. male, here for evaluation of the following medical concerns:    Patient is a 68-year-old male who has a past medical history of type II diabetes, Ed, HLD who presents to f/u his chronic medical issues and for annual physical/wellness exam.      Trigger Finger, Pain, Swelling in hands. Report numbness.  Patient had EMG/MCV ordered and completed after his last office appointment. Patient has findings of right severe carpal tunnel syndrome, moderate to severe left carpal tunnel syndrome, and moderate to severe left cubital tunnel syndrome. Testing also suggest peripheral polyneuropathy. Patient was sent to orthopedic surgery for this issue. Steroid injections were completed. Patient may be a surgical candidate.     Neck/Shoulder pain issues: Pain worsening over the past year. Radicular pain in to his neck and arms. Mainly on the right. Stable currently.     DM: Patient reports his blood sugars at home have been in the 100-1 20 range. No low blood sugars. Patient has been tolerating his medications well. Patient requesting refill.     History of hyperlipidemia: Patient is on Lipitor 20 mg daily. Tolerates medication well without issue.     Erectile dysfunction: Cialis previously prescribed. Patient yet to take this medication. Patient will need to discuss this medication with cardiology in the future. Strokelike symptoms with dizziness.: Patient was in the emergency department as noted at his last office appointment. Patient did see neurology. Work-up was completed in the ER. Patient was not fully admitted. Patient had concerns of possible hypoglycemia. Holter monitor: Essentially normal.  Moderate burden of PACs present. Echo pending.     Labs:  Pending.     BMI 31.85     Health maintenance: Pneumonia vaccine, shingles vaccine, Covid vaccine.     Review of Systems Constitutional: Negative for chills and fever. HENT: Negative for congestion, rhinorrhea and sore throat. Respiratory: Negative for shortness of breath and wheezing. Cardiovascular: Negative for chest pain and leg swelling. Gastrointestinal: Negative for abdominal pain, constipation, diarrhea, nausea and vomiting. Skin: Negative for rash. Neurological: Negative for light-headedness and headaches. Past Medical History:   Diagnosis Date    Diabetes mellitus type 2 in obese ([address]     Diverticulitis     Hyperlipidemia        Prior to Visit Medications    Medication Sig Taking?  Authorizing Provider   glimepiride (AMARYL) 4 MG tablet Take 1 tablet by mouth 2 times daily Yes Baldo Carrasco MD   atorvastatin (LIPITOR) 20 MG tablet Take 1 tablet by mouth daily Yes Baldo Carrasco MD   metFORMIN (GLUCOPHAGE-XR) 500 MG extended release tablet Take 2 tablets by mouth 2 times daily Yes Baldo Carrasco MD   aspirin EC 81 MG EC tablet Take 1 tablet by mouth daily Yes Baldo Carrasco MD   VITAMIN E PO Take 1 capsule by mouth daily Yes Historical Provider, MD   Cyanocobalamin (VITAMIN B 12 PO) Take 1 tablet by mouth daily Yes Historical Provider, MD   Ascorbic Acid (VITAMIN C PO) Take 1 tablet by mouth daily Yes Historical Provider, MD   VITAMIN D PO Take 1 tablet by mouth daily Yes Historical Provider, MD   Omega-3 Fatty Acids (FISH OIL PO) Take 1 capsule by mouth daily Yes Historical Provider, MD   MAGNESIUM PO Take 1 capsule by mouth daily Yes Historical Provider, MD        No Known Allergies    Social History     Tobacco Use    Smoking status: Current Every Day Smoker     Packs/day: 0.25     Years: 43.00     Pack years: 10.75     Start date: 1977    Smokeless tobacco: Never Used   Substance Use Topics    Alcohol use: Not Currently           Vitals:    10/28/21 1552   BP: 132/76   Pulse: 84   Resp: 18   Temp: 97.1 °F (36.2 °C)   TempSrc: Temporal   SpO2: 97%   Weight: 229 lb (103.9 kg) Height: 5' 10\" (1.778 m)     Estimated body mass index is 32.86 kg/m² as calculated from the following:    Height as of this encounter: 5' 10\" (1.778 m). Weight as of this encounter: 229 lb (103.9 kg). Physical Exam  Constitutional:       Appearance: He is well-developed. HENT:      Head: Normocephalic. Eyes:      Extraocular Movements: Extraocular movements intact. Conjunctiva/sclera: Conjunctivae normal.   Cardiovascular:      Rate and Rhythm: Normal rate and regular rhythm. Heart sounds: Normal heart sounds. No murmur heard. Pulmonary:      Effort: Pulmonary effort is normal.      Breath sounds: Normal breath sounds. No wheezing or rales. Abdominal:      General: Bowel sounds are normal.      Palpations: Abdomen is soft. Tenderness: There is no abdominal tenderness. Musculoskeletal:      Right lower leg: No edema. Left lower leg: No edema. Neurological:      General: No focal deficit present. Mental Status: He is alert. Comments: Cranial nerves grossly intact   Psychiatric:         Mood and Affect: Mood normal.         Judgment: Judgment normal.         ASSESSMENT/PLAN:  Karen Garcia was seen today for results. Diagnoses and all orders for this visit:    Premature atrial contraction  -     Jacinta Camara MD, Cardiology, [place]  Concerns that the patient's PACs might lead to another arrhythmia and may have caused some of his strokelike symptoms that led to his recent hospitalization. Neurology work-up was negative. Type 2 diabetes mellitus with other specified complication, without long-term current use of insulin (HCC)  -     CBC Auto Differential; Future  -     Comprehensive Metabolic Panel; Future  -     Lipid Panel; Future  -     Microalbumin / Creatinine Urine Ratio; Future  -     Hemoglobin A1C; Future  Blood sugars have been appropriate. Labs due. Lifestyle medications reviewed and advised. Will need yearly diabetic eye and foot exams.     Mixed hyperlipidemia  -     Lipid Panel; Future  Currently on statin. Tolerating well without issue. Will work on increasing his statin in the near future. Erectile dysfunction, unspecified erectile dysfunction type  Patient will discuss Cialis with his cardiologist given his recent hospitalization for strokelike symptoms. Robyn Diaz MD, Cardiology, [place]  Stroke-like symptoms  -     Shonda Naik MD, Cardiology, [place]    Other orders  -     glimepiride (AMARYL) 4 MG tablet; Take 1 tablet by mouth 2 times daily    We will call patient with lab results. Patient to follow-up with cardiology. Complete echo. We will call him with echo results. If any changes are made by cardiology patient is to follow-up with myself sooner otherwise follow-up in 4 months. Return in about 4 months (around 2/28/2022). An [employer] was used to authenticate this note.     --Iris Carrizales MD on 10/28/21 at 4:03 PM EDT

## 2021-10-29 ENCOUNTER — TELEPHONE (OUTPATIENT)
Dept: ADMINISTRATIVE | Age: 61
End: 2021-10-29

## 2021-10-29 NOTE — TELEPHONE ENCOUNTER
Patient Appointment Form:      PCP: Dr. Mell Torres  Referring: Dr. Mell Torres    Has the Patient:    Seen a Cardiologist? no    Had a heart catheterization? no    Had heart surgery? no    Had a stress test or nuclear stress test? no    Had an echocardiogram? yes   date: 9/30/21   facility name:  [facility])    Had a vascular ultrasound? no    Had a 24/48 heart monitor or extended cardiac event monitor?  48hr   date: 10/20/21      Who ordered: Yanet German    Had recent blood work in the last 6 months? yes    date: recent    ordering physician: Yanet German    Had a pacemaker/ICD/ILR implant? no    Seen an Electrophysiologist? no        Will send records via: in [facility]      Date & time of appointment:  Anoop@[employer]

## 2021-11-16 ENCOUNTER — HOSPITAL ENCOUNTER (OUTPATIENT)
Dept: CARDIOLOGY | Age: 61
Discharge: HOME | End: 2021-11-16
Payer: COMMERCIAL

## 2021-11-16 DIAGNOSIS — R29.90 STROKE-LIKE SYMPTOMS: ICD-10-CM

## 2021-11-16 DIAGNOSIS — Z09 HOSPITAL DISCHARGE FOLLOW-UP: ICD-10-CM

## 2021-11-16 DIAGNOSIS — R42 DIZZINESS: ICD-10-CM

## 2021-11-16 LAB
LV EF: 65 %
LVEF MODALITY: NORMAL

## 2021-11-16 PROCEDURE — 2580000003 HC RX 258: Performed by: FAMILY MEDICINE

## 2021-11-16 PROCEDURE — 93306 TTE W/DOPPLER COMPLETE: CPT | Performed by: PSYCHIATRY & NEUROLOGY

## 2021-11-16 RX ORDER — SODIUM CHLORIDE 0.9 % (FLUSH) 0.9 %
5-40 SYRINGE (ML) INJECTION PRN
Status: DISCONTINUED | OUTPATIENT
Start: 2021-11-16 | End: 2021-11-17 | Stop reason: HOSPADM

## 2021-11-16 RX ADMIN — SODIUM CHLORIDE, PRESERVATIVE FREE 10 ML: 5 INJECTION INTRAVENOUS at 08:51

## 2021-11-16 RX ADMIN — SODIUM CHLORIDE, PRESERVATIVE FREE 10 ML: 5 INJECTION INTRAVENOUS at 08:53

## 2021-11-19 ENCOUNTER — OFFICE VISIT (OUTPATIENT)
Dept: CARDIOLOGY CLINIC | Age: 61
End: 2021-11-19
Payer: COMMERCIAL

## 2021-11-19 VITALS
BODY MASS INDEX: 31.95 KG/M2 | HEART RATE: 80 BPM | RESPIRATION RATE: 16 BRPM | DIASTOLIC BLOOD PRESSURE: 74 MMHG | OXYGEN SATURATION: 96 % | HEIGHT: 70 IN | WEIGHT: 223.2 LBS | SYSTOLIC BLOOD PRESSURE: 110 MMHG

## 2021-11-19 DIAGNOSIS — I49.1 PAC (PREMATURE ATRIAL CONTRACTION): ICD-10-CM

## 2021-11-19 DIAGNOSIS — Q21.12 PFO (PATENT FORAMEN OVALE): Primary | ICD-10-CM

## 2021-11-19 DIAGNOSIS — R29.90 STROKE-LIKE SYMPTOMS: ICD-10-CM

## 2021-11-19 PROCEDURE — G8417 CALC BMI ABV UP PARAM F/U: HCPCS | Performed by: INTERNAL MEDICINE

## 2021-11-19 PROCEDURE — 99244 OFF/OP CNSLTJ NEW/EST MOD 40: CPT | Performed by: INTERNAL MEDICINE

## 2021-11-19 PROCEDURE — G8427 DOCREV CUR MEDS BY ELIG CLIN: HCPCS | Performed by: INTERNAL MEDICINE

## 2021-11-19 PROCEDURE — G8484 FLU IMMUNIZE NO ADMIN: HCPCS | Performed by: INTERNAL MEDICINE

## 2021-11-19 PROCEDURE — 93000 ELECTROCARDIOGRAM COMPLETE: CPT | Performed by: INTERNAL MEDICINE

## 2021-11-19 NOTE — PROGRESS NOTES
Patient Active Problem List   Diagnosis    Diabetes mellitus [address] [address]    Mixed hyperlipidemia    Erectile dysfunction    Trigger finger, unspecified finger    Bilateral rotator cuff dysfunction    Stroke-like symptoms    Dizziness       Current Outpatient Medications   Medication Sig Dispense Refill    glimepiride (AMARYL) 4 MG tablet Take 1 tablet by mouth 2 times daily 180 tablet 1    atorvastatin (LIPITOR) 20 MG tablet Take 1 tablet by mouth daily 90 tablet 1    metFORMIN (GLUCOPHAGE-XR) 500 MG extended release tablet Take 2 tablets by mouth 2 times daily 360 tablet 1    aspirin EC 81 MG EC tablet Take 1 tablet by mouth daily 90 tablet 1    VITAMIN E PO Take 1 capsule by mouth daily      Cyanocobalamin (VITAMIN B 12 PO) Take 1 tablet by mouth daily      Ascorbic Acid (VITAMIN C PO) Take 1 tablet by mouth daily      VITAMIN D PO Take 1 tablet by mouth daily      Omega-3 Fatty Acids (FISH OIL PO) Take 1 capsule by mouth daily      MAGNESIUM PO Take 1 capsule by mouth daily       No current facility-administered medications for this visit. CC:    Patient is seen in follow up for:  1. PFO (patent foramen ovale)    2. PAC (premature atrial contraction)    3. Stroke-like symptoms        HPI:  Patient is seen in evaluation following possible CVA and for Holter monitor revealing frequent PACs. Patient had episode of sudden onset of pain in his head, weakness, blurred vision, and diaphoresis while playing golf. He has never had any similar episode in the past.  He underwent evaluation by CT and MR that did not reveal any evidence for an acute CVA and was discharged from the ER. He was started on therapy with aspirin and atorvastatin. Outpatient evaluation included a 48 Holter monitor which revealed frequent PACs. A 2D echo revealed evidence for a PFO with right to left shunting noted. Patient does have a long history of smoking and has been on insulin diabetes x5 years.       ROS:   General: No unusual weight gain, no change in exercise tolerance  Skin: No rash or itching  EENT: No vision changes or nosebleeds  Cardiovascular: No orthopnea or paroxysmal nocturnal dyspnea  Respiratory: No cough or hemoptysis  Gastrointestinal: No hematemesis or recent changes in bowel habits  Genitourinary: No hematuria, urgency or frequency  Musculoskeletal: No muscular weakness or joint swelling   Neurologic / Psychiatric: No incoordination or convulsions  Allergic / Immunologic/ Lymphatic / Endocrine: No anemia or bleeding tendency    Social History     Socioeconomic History    Marital status:      Spouse name: Not on file    Number of children: Not on file    Years of education: Not on file    Highest education level: Not on file   Occupational History    Not on file   Tobacco Use    Smoking status: Current Every Day Smoker     Packs/day: 0.25     Years: 43.00     Pack years: 10.75     Start date: 1977    Smokeless tobacco: Never Used   Vaping Use    Vaping Use: Never used   Substance and Sexual Activity    Alcohol use: Not Currently    Drug use: Never    Sexual activity: Not on file   Other Topics Concern    Not on file   Social History Narrative    Not on file     Social Determinants of Health     Financial Resource Strain: Low Risk     Difficulty of Paying Living Expenses: Not hard at all   Food Insecurity: No Food Insecurity    Worried About Running Out of Food in the Last Year: Never true    Chanda of Food in the Last Year: Never true   Transportation Needs:     Lack of Transportation (Medical): Not on file    Lack of Transportation (Non-Medical):  Not on file   Physical Activity:     Days of Exercise per Week: Not on file    Minutes of Exercise per Session: Not on file   Stress:     Feeling of Stress : Not on file   Social Connections:     Frequency of Communication with Friends and Family: Not on file    Frequency of Social Gatherings with Friends and Family: Not on file    Attends Latter day Services: Not on file    Active Member of Clubs or Organizations: Not on file    Attends Club or Organization Meetings: Not on file    Marital Status: Not on file   Intimate Partner Violence:     Fear of Current or Ex-Partner: Not on file    Emotionally Abused: Not on file    Physically Abused: Not on file    Sexually Abused: Not on file   Housing Stability:     Unable to Pay for Housing in the Last Year: Not on file    Number of [place] in the Last Year: Not on file    Unstable Housing in the Last Year: Not on file       Family History   Problem Relation Age of Onset    Stroke Mother     Diabetes Mother     Heart Attack Mother     Cancer Father         leukemia    Cancer Sister         leukemia       Past Medical History:   Diagnosis Date    Diabetes mellitus type 2 in obese ([address]     Diverticulitis     Hyperlipidemia        PHYSICAL EXAM:  CONSTITUTIONAL:  Well developed, well nourished    Vitals:    11/19/21 1406   BP: 110/74   Pulse: 80   Resp: 16   SpO2: 96%   Weight: 223 lb 3.2 oz (101.2 kg)   Height: 5' 10\" (1.778 m)     HEAD & FACE: Normocephalic. Symmetric. EYES: No xanthelasma. Conjunctivae not injected. EARS, NOSE, MOUTH & THROAT: Good dentition. No oral pallor or cyanosis. NECK: No JVD at 30 degrees. No thyromegaly. RESPIRATORY: Clear to auscultation and percussion in all fields. No use of accessory muscle or intercostal retractions. CARDIOVASCULAR: Regular rate and rhythm. No lifts or thrills on palpitation. Auscultation with normal S1-S2 in intensity and splitting. No carotid bruits. Abdominal aorta not enlarged. Femoral arteries without bruits. Pedal pulses 2+. No edema. ABDOMEN: Soft without hepatic or splenic enlargement. No tenderness. MUSCULOSKELETAL: No kyphosis or scoliosis of the back. Good muscle strength and tone. No muscle atrophy. Normal gait and ability to undergo exercise stress testing. EXTREMITIES: No clubbing or cyanosis. SKIN: No Xanthomas or ulcerations. NEUROLOGIC: Oriented to time, place and person. Normal mood and affect. LYMPHATIC:  No palpable neck or supraclavicular nodes. No splenomegaly. EKG: the EKG tracing was reviewed and found to reveal: Normal sinus rhythm. PACs.  ms. No change compared to prior tracing. ASSESSMENT:                                                     ORDERS:       Diagnosis Orders   1. PFO (patent foramen ovale)  EKG 12 Lead   2. PAC (premature atrial contraction)  EKG 12 Lead    Cardiac Stress Test Exercise - Treadmill   3. Stroke-like symptoms       Above assessment cardiac issues stable. PLAN:   See above orders. Medication reconciliation completed. Old records were reviewed and found to reveal: 5/24/2021 lipid panel with LDL 78  Will need Neurology opinion on possible embolic component to CVA. May need 30-day monitoring. Discussed issues that would prompt earlier evaluation. Same cardiac medications. Follow-up office visit - will call patient with results.

## 2021-11-23 DIAGNOSIS — Q21.12 PFO (PATENT FORAMEN OVALE): ICD-10-CM

## 2021-11-23 DIAGNOSIS — R29.90 STROKE-LIKE SYMPTOMS: Primary | ICD-10-CM

## 2021-12-08 ENCOUNTER — TELEPHONE (OUTPATIENT)
Dept: CARDIOLOGY | Age: 61
End: 2021-12-08

## 2021-12-08 NOTE — TELEPHONE ENCOUNTER
SCHEDULED TREADMILL STRESS TEST FOR 12-10-21. REVIEWED COVID CHECKLIST WITH PATIENT.     Electronically signed by Stephanie David on 12/8/2021 at 11:34 AM

## 2021-12-16 ENCOUNTER — TELEPHONE (OUTPATIENT)
Dept: CARDIOLOGY | Age: 61
End: 2021-12-16

## 2021-12-16 NOTE — TELEPHONE ENCOUNTER
12-16-21 @ 9455. Called and spoke with the patient with a reminder regarding his stress test on 12-17-21 @ 9451. Reviewed instructions including not to take his diabetic medications until after his test. Also reviewed Covid checklist. Patient voiced understanding to all instructions.   Adarsh Pagan RN  [facility]

## 2021-12-17 ENCOUNTER — HOSPITAL ENCOUNTER (OUTPATIENT)
Dept: CARDIOLOGY | Age: 61
Discharge: HOME | End: 2021-12-17
Payer: COMMERCIAL

## 2021-12-17 ENCOUNTER — TELEPHONE (OUTPATIENT)
Dept: CARDIOLOGY CLINIC | Age: 61
End: 2021-12-17

## 2021-12-17 VITALS
HEIGHT: 70 IN | SYSTOLIC BLOOD PRESSURE: 110 MMHG | HEART RATE: 72 BPM | BODY MASS INDEX: 31.92 KG/M2 | WEIGHT: 223 LBS | DIASTOLIC BLOOD PRESSURE: 64 MMHG

## 2021-12-17 PROCEDURE — 93017 CV STRESS TEST TRACING ONLY: CPT

## 2021-12-17 NOTE — TELEPHONE ENCOUNTER
----- Message from Girish Murray MD sent at 12/17/2021  3:21 PM EST -----  Please let patient know that stress test was normal.  Also, please check if he has an appointment scheduled with Neurology.

## 2022-02-01 DIAGNOSIS — R29.90 STROKE-LIKE SYMPTOMS: ICD-10-CM

## 2022-02-01 DIAGNOSIS — E78.2 MIXED HYPERLIPIDEMIA: ICD-10-CM

## 2022-02-01 DIAGNOSIS — E11.69 TYPE 2 DIABETES MELLITUS WITH OTHER SPECIFIED COMPLICATION, WITHOUT LONG-TERM CURRENT USE OF INSULIN (HCC): ICD-10-CM

## 2022-02-02 RX ORDER — METFORMIN HYDROCHLORIDE 500 MG/1
1000 TABLET, EXTENDED RELEASE ORAL 2 TIMES DAILY
Qty: 360 TABLET | Refills: 1 | Status: SHIPPED
Start: 2022-02-02 | End: 2022-09-15 | Stop reason: SDUPTHER

## 2022-02-02 RX ORDER — ATORVASTATIN CALCIUM 20 MG/1
20 TABLET, FILM COATED ORAL DAILY
Qty: 90 TABLET | Refills: 1 | Status: SHIPPED
Start: 2022-02-02 | End: 2022-05-11 | Stop reason: SDUPTHER

## 2022-02-02 RX ORDER — GLIMEPIRIDE 4 MG/1
4 TABLET ORAL 2 TIMES DAILY
Qty: 180 TABLET | Refills: 1 | Status: SHIPPED | OUTPATIENT
Start: 2022-02-02

## 2022-02-10 NOTE — PROCEDURES
[address] [facility] [address] [phone]    Exercise Stress Study (non-nuclear)      Name: Zak Mayo Account Number:  [de-identified]      :  1960          Sex: male         Date of Study:  2021    Height: 5' 10\" (177.8 cm)          Weight: 223 lb (101.2 kg)    Ordering Provider: Daron Chan MD          PCP: Albaro Hernandez MD    Cardiologist: Daron Chan MD              Interpreting Physician: Briana Veras DO    Indication:   Detecting the presence and location of coronary artery disease    Clinical History:   Patient has no known history of coronary artery disease. Resting ECG:    Sinus rhythm, 72 bpm.  Normal axis. Exercise: The patient exercised using a Dong protocol, completing 6:45 minutes and reaching an estimated work load of 7.0 metabolic equivalents (METS). Resting HR was 72. Peak exercise heart rate was 137 ( 87% of maximum predicted heart rate for age). Baseline /64. Peak exercise /74. The blood pressure response to exercise was normal      Exercise was terminated due to dyspnea, patient request and heart rate attained, left knee pain. The patient experienced no chest pain with exercise. Exercise ECG:   The patient demonstrated occasional PAC's during exercise. With exercise, there were no ST segment changes of significance at the heart rate achieved. Beckett treadmill score was 6.5 implying low risk. Impression:    1. Exercise EKG was  negative. 2. The patient experienced no chest pain with exercise. 3. Beckett treadmill score was 6.5 implying low risk. 4. Exercise capacity was average. 5. Low risk general exercise treadmill test.    Thank you for sending your patient to this [facility].     Electronically signed by Rubin Miles DO on 21 at 1:04 PM EST
Negative Screen

## 2022-02-28 ENCOUNTER — OFFICE VISIT (OUTPATIENT)
Dept: PRIMARY CARE CLINIC | Age: 62
End: 2022-02-28
Payer: COMMERCIAL

## 2022-02-28 VITALS
TEMPERATURE: 97.6 F | BODY MASS INDEX: 32.64 KG/M2 | HEIGHT: 70 IN | OXYGEN SATURATION: 97 % | HEART RATE: 69 BPM | RESPIRATION RATE: 18 BRPM | DIASTOLIC BLOOD PRESSURE: 76 MMHG | WEIGHT: 228 LBS | SYSTOLIC BLOOD PRESSURE: 120 MMHG

## 2022-02-28 DIAGNOSIS — G56.03 BILATERAL CARPAL TUNNEL SYNDROME: ICD-10-CM

## 2022-02-28 DIAGNOSIS — E66.09 CLASS 1 OBESITY DUE TO EXCESS CALORIES WITH SERIOUS COMORBIDITY AND BODY MASS INDEX (BMI) OF 32.0 TO 32.9 IN ADULT: ICD-10-CM

## 2022-02-28 DIAGNOSIS — N52.9 ERECTILE DYSFUNCTION, UNSPECIFIED ERECTILE DYSFUNCTION TYPE: ICD-10-CM

## 2022-02-28 DIAGNOSIS — E78.2 MIXED HYPERLIPIDEMIA: ICD-10-CM

## 2022-02-28 DIAGNOSIS — E11.69 TYPE 2 DIABETES MELLITUS WITH OTHER SPECIFIED COMPLICATION, WITHOUT LONG-TERM CURRENT USE OF INSULIN (HCC): Primary | ICD-10-CM

## 2022-02-28 DIAGNOSIS — E55.9 VITAMIN D DEFICIENCY: ICD-10-CM

## 2022-02-28 DIAGNOSIS — Q21.12 PATENT FORAMEN OVALE WITH RIGHT TO LEFT SHUNT: ICD-10-CM

## 2022-02-28 DIAGNOSIS — R29.90 STROKE-LIKE SYMPTOMS: ICD-10-CM

## 2022-02-28 LAB — HBA1C MFR BLD: 11.7 %

## 2022-02-28 PROCEDURE — G8427 DOCREV CUR MEDS BY ELIG CLIN: HCPCS | Performed by: FAMILY MEDICINE

## 2022-02-28 PROCEDURE — G8484 FLU IMMUNIZE NO ADMIN: HCPCS | Performed by: FAMILY MEDICINE

## 2022-02-28 PROCEDURE — 3046F HEMOGLOBIN A1C LEVEL >9.0%: CPT | Performed by: FAMILY MEDICINE

## 2022-02-28 PROCEDURE — G8417 CALC BMI ABV UP PARAM F/U: HCPCS | Performed by: FAMILY MEDICINE

## 2022-02-28 PROCEDURE — 4004F PT TOBACCO SCREEN RCVD TLK: CPT | Performed by: FAMILY MEDICINE

## 2022-02-28 PROCEDURE — 3017F COLORECTAL CA SCREEN DOC REV: CPT | Performed by: FAMILY MEDICINE

## 2022-02-28 PROCEDURE — 99214 OFFICE O/P EST MOD 30 MIN: CPT | Performed by: FAMILY MEDICINE

## 2022-02-28 PROCEDURE — 83036 HEMOGLOBIN GLYCOSYLATED A1C: CPT | Performed by: FAMILY MEDICINE

## 2022-02-28 PROCEDURE — 2022F DILAT RTA XM EVC RTNOPTHY: CPT | Performed by: FAMILY MEDICINE

## 2022-02-28 ASSESSMENT — PATIENT HEALTH QUESTIONNAIRE - PHQ9
SUM OF ALL RESPONSES TO PHQ QUESTIONS 1-9: 0
SUM OF ALL RESPONSES TO PHQ9 QUESTIONS 1 & 2: 0
2. FEELING DOWN, DEPRESSED OR HOPELESS: 0
SUM OF ALL RESPONSES TO PHQ QUESTIONS 1-9: 0
1. LITTLE INTEREST OR PLEASURE IN DOING THINGS: 0

## 2022-02-28 ASSESSMENT — ENCOUNTER SYMPTOMS
WHEEZING: 0
NAUSEA: 0
SHORTNESS OF BREATH: 0
ABDOMINAL PAIN: 0
RHINORRHEA: 0
CONSTIPATION: 0
SORE THROAT: 0
VOMITING: 0
DIARRHEA: 0

## 2022-02-28 NOTE — PROGRESS NOTES
2022     Chief Complaint   Patient presents with    Diabetes     check up      HPI  Galdino Dacosta (:  1960) is a 60 y.o. male, here for evaluation of the following medical concerns:    Patient is a 70-year-old male who has a past medical history of type II diabetes, Ed, HLD who presents to f/u his chronic medical issues.      Trigger Finger, Pain, Swelling in hands. Report numbness.  Patient had EMG/MCV ordered and completed after his last office appointment. Patient has findings of right severe carpal tunnel syndrome, moderate to severe left carpal tunnel syndrome, and moderate to severe left cubital tunnel syndrome. Testing also suggest peripheral polyneuropathy. Patient was sent to orthopedic surgery for this issue. Steroid injections were completed. Patient may be a surgical candidate.     Neck/Shoulder pain issues: Pain worsening over the past year. Radicular pain in to his neck and arms. Mainly on the right.  Stable currently.     DM: Patient reports his blood sugars at home have been in the 120-160 range. No low blood sugars. Patient has been tolerating his medications well. A1c today is 11.7%.     History of hyperlipidemia: Patient is on Lipitor 20 mg daily. Tolerates medication well without issue.     Erectile dysfunction: Cialis prn     Strokelike symptoms with dizziness.: Patient was in the emergency department as noted at his last office appointment. Patient did see neurology. Work-up was completed in the ER. Patient was not fully admitted. Patient had concerns of possible hypoglycemia.     Patient was sent to Cardiology  Holter monitor: Essentially normal.  Moderate burden of PACs present. Echo: PFO w r-->L shunting. Stress test was low risk. Cardiology wanted neurology input in regards for embolic stroke concerns. Labs:  Pending.     BMI 31.85     Health maintenance: Pneumonia vaccine, shingles vaccine, Covid vaccine.     Review of Systems   Constitutional: Negative for chills and fever. HENT: Negative for congestion, rhinorrhea and sore throat. Respiratory: Negative for shortness of breath and wheezing. Cardiovascular: Negative for chest pain and leg swelling. Gastrointestinal: Negative for abdominal pain, constipation, diarrhea, nausea and vomiting. Skin: Negative for rash. Neurological: Negative for light-headedness and headaches. Past Medical History:   Diagnosis Date    Diabetes mellitus type 2 in obese ([address]     Diverticulitis     Hyperlipidemia        Prior to Visit Medications    Medication Sig Taking?  Authorizing Provider   atorvastatin (LIPITOR) 20 MG tablet Take 1 tablet by mouth daily Yes Nereyda Zambrano MD   metFORMIN (GLUCOPHAGE-XR) 500 MG extended release tablet Take 2 tablets by mouth 2 times daily Yes Nereyda Zambrano MD   glimepiride (AMARYL) 4 MG tablet Take 1 tablet by mouth 2 times daily Yes Nereyda Zambrano MD   aspirin EC 81 MG EC tablet Take 1 tablet by mouth daily Yes Nereyda Zambrano MD   VITAMIN E PO Take 1 capsule by mouth daily Yes Historical Provider, MD   Cyanocobalamin (VITAMIN B 12 PO) Take 1 tablet by mouth daily Yes Historical Provider, MD   Ascorbic Acid (VITAMIN C PO) Take 1 tablet by mouth daily Yes Historical Provider, MD   VITAMIN D PO Take 1 tablet by mouth daily Yes Historical Provider, MD   Omega-3 Fatty Acids (FISH OIL PO) Take 1 capsule by mouth daily Yes Historical Provider, MD   MAGNESIUM PO Take 1 capsule by mouth daily Yes Historical Provider, MD        No Known Allergies    Social History     Tobacco Use    Smoking status: Current Every Day Smoker     Packs/day: 0.25     Years: 43.00     Pack years: 10.75     Start date: 1977    Smokeless tobacco: Never Used   Substance Use Topics    Alcohol use: Not Currently           Vitals:    02/28/22 1557   BP: 120/76   Pulse: 69   Resp: 18   Temp: 97.6 °F (36.4 °C)   TempSrc: Temporal   SpO2: 97%   Weight: 228 lb (103.4 kg)   Height: 5' 10\" (1.778 m) Estimated body mass index is 32.71 kg/m² as calculated from the following:    Height as of this encounter: 5' 10\" (1.778 m). Weight as of this encounter: 228 lb (103.4 kg). Physical Exam  Constitutional:       Appearance: He is well-developed. HENT:      Head: Normocephalic. Eyes:      Extraocular Movements: Extraocular movements intact. Conjunctiva/sclera: Conjunctivae normal.   Cardiovascular:      Rate and Rhythm: Normal rate and regular rhythm. Heart sounds: Normal heart sounds. No murmur heard. Pulmonary:      Effort: Pulmonary effort is normal.      Breath sounds: Normal breath sounds. No wheezing or rales. Abdominal:      General: Bowel sounds are normal.      Palpations: Abdomen is soft. Tenderness: There is no abdominal tenderness. Musculoskeletal:      Right lower leg: No edema. Left lower leg: No edema. Neurological:      General: No focal deficit present. Mental Status: He is alert. Comments: Cranial nerves grossly intact   Psychiatric:         Mood and Affect: Mood normal.         Judgment: Judgment normal.         ASSESSMENT/PLAN:  Augustina Fisher was seen today for diabetes. Diagnoses and all orders for this visit:    Type 2 diabetes mellitus with other specified complication, without long-term current use of insulin (HCC)  -     POCT glycosylated hemoglobin (Hb A1C)  -     CBC with Auto Differential; Future  -     Comprehensive Metabolic Panel; Future  -     Microalbumin / Creatinine Urine Ratio; Future  Uncontrolled. Patient will not be due for updated lab work. Hemoglobin A1c at 11.7%. Patient reports he did miss his medications for short period of time. Patient reports he has been compliant recently. Patient is to check his fasting sugars over the next 2 weeks. Patient to call with an update in regards to his blood sugars. We will make modifications based on the findings.   Patient was advised to contact his insurance to see if a GLP-1 injectable medication is covered. Patient did well on this in the past.  Will need yearly diabetic eye and foot exams. Mixed hyperlipidemia  -     Lipid Panel; Future  Patient is on Lipitor 20 mg daily. Pending lipid panel. Vitamin D deficiency  -     Vitamin D 25 Hydroxy; Future    Bilateral carpal tunnel syndrome  Patient to follow-up with orthopedic surgery for repeat injections. Patient had a an injection bilaterally and his wrist approximately 8 months ago. Symptoms have slowly returned. Stroke-like symptoms  Patent foramen ovale with right to left shunt  Cleared from cardiology standpoint. However patient does have a PFO. Cardiology would like neurology's input in regards to this finding and the cause of his possible stroke. Erectile dysfunction, unspecified erectile dysfunction type  Viagra or Cialis. Class 1 obesity due to excess calories with serious comorbidity and body mass index (BMI) of 32.0 to 32.9 in adult  Lifestyle modifications advisable. Patient to call with blood sugar readings. Will call patient with lab results. Patient to follow-up in 4 months. Patient to notify us if he is able to afford any alternative medications. Return in about 4 months (around 6/28/2022) for To Discuss Labs Results, Routine Follow-up of Chronic Conditions. An [place] [employer] was used to authenticate this note.     --Daniel Sánchez MD on 2/28/22 at 1:06 PM EST

## 2022-03-02 DIAGNOSIS — E11.69 TYPE 2 DIABETES MELLITUS WITH OTHER SPECIFIED COMPLICATION, WITHOUT LONG-TERM CURRENT USE OF INSULIN (HCC): ICD-10-CM

## 2022-03-28 DIAGNOSIS — E11.69 TYPE 2 DIABETES MELLITUS WITH OTHER SPECIFIED COMPLICATION, WITHOUT LONG-TERM CURRENT USE OF INSULIN (HCC): ICD-10-CM

## 2022-03-28 PROCEDURE — 3046F HEMOGLOBIN A1C LEVEL >9.0%: CPT | Performed by: FAMILY MEDICINE

## 2022-04-19 ENCOUNTER — OFFICE VISIT (OUTPATIENT)
Dept: ORTHOPEDIC SURGERY | Age: 62
End: 2022-04-19
Payer: COMMERCIAL

## 2022-04-19 VITALS — BODY MASS INDEX: 31.78 KG/M2 | WEIGHT: 222 LBS | HEIGHT: 70 IN

## 2022-04-19 DIAGNOSIS — G56.03 BILATERAL CARPAL TUNNEL SYNDROME: Primary | ICD-10-CM

## 2022-04-19 PROCEDURE — 20526 THER INJECTION CARP TUNNEL: CPT | Performed by: ORTHOPAEDIC SURGERY

## 2022-04-19 RX ORDER — TRIAMCINOLONE ACETONIDE 40 MG/ML
20 INJECTION, SUSPENSION INTRA-ARTICULAR; INTRAMUSCULAR ONCE
Status: COMPLETED | OUTPATIENT
Start: 2022-04-19 | End: 2022-04-19

## 2022-04-19 RX ORDER — BUPIVACAINE HYDROCHLORIDE 2.5 MG/ML
0.5 INJECTION, SOLUTION INFILTRATION; PERINEURAL ONCE
Status: COMPLETED | OUTPATIENT
Start: 2022-04-19 | End: 2022-04-19

## 2022-04-19 RX ADMIN — TRIAMCINOLONE ACETONIDE 20 MG: 40 INJECTION, SUSPENSION INTRA-ARTICULAR; INTRAMUSCULAR at 09:15

## 2022-04-19 RX ADMIN — BUPIVACAINE HYDROCHLORIDE 1.25 MG: 2.5 INJECTION, SOLUTION INFILTRATION; PERINEURAL at 09:15

## 2022-04-19 NOTE — PROGRESS NOTES
Chief Complaint:   Chief Complaint   Patient presents with    Hand Pain     Bilateral hand pain, swelling and numbness. Has difficulty sleeping at night due to the numbness. Requesting injections. Last injections received in July 2021, helped for approximately 6 months. Would like to schedule left CTR in November. Micaela Otoole presents with recurrent bilateral pain numbness tingling burning in the fingers of both hands more radial than ulnar, did very well with injections last year until the past couple of months, symptoms at this point now recurrent severe constant but aggravated by activities including golf and interfering with sleep as well. Otherwise doing well denies weakness but does note stiffness in both hands. Allergies; medications; past medical, surgical, family, and social history; and problem list have been reviewed today and updated as indicated in this encounter seen below. Exam: Bilateral hands show no intrinsic or thenar atrophy.  strength within normal limits, there is some nonspecific puffiness without erythema. Subjectively diminished sensation to touch especially in the tip of the thumb and index finger. Radiographs: Not applicable    Bradley Hooks was seen today for hand pain. Diagnoses and all orders for this visit:    Bilateral carpal tunnel syndrome  -     RI INJECT CARPAL TUNNEL    Other orders  -     triamcinolone acetonide (KENALOG-40) injection 20 mg  -     bupivacaine (MARCAINE) 0.25 % injection 1.25 mg       Treatment options were again reviewed with the patient, he has done well with injections and and has shown no sign of permanent damage at this point, he would like to get through golf season before proceeding with more definitive treatment that is carpal tunnel release, I reviewed those procedures in detail with him including the staged nature and the probable time of recovery.   At his request as a temporizing measure I repeated the injection of Kenalog and Marcaine to the bilateral carpal tunnels through volar approach with Kenalog and Marcaine he tolerated these well and will follow-up as needed. Return if symptoms worsen or fail to improve. Current Outpatient Medications   Medication Sig Dispense Refill    Dulaglutide 1.5 MG/0.5ML SOPN Inject 1.5 mg into the skin every 7 days 12 pen 3    atorvastatin (LIPITOR) 20 MG tablet Take 1 tablet by mouth daily 90 tablet 1    metFORMIN (GLUCOPHAGE-XR) 500 MG extended release tablet Take 2 tablets by mouth 2 times daily 360 tablet 1    glimepiride (AMARYL) 4 MG tablet Take 1 tablet by mouth 2 times daily 180 tablet 1    aspirin EC 81 MG EC tablet Take 1 tablet by mouth daily 90 tablet 1    VITAMIN E PO Take 1 capsule by mouth daily      Cyanocobalamin (VITAMIN B 12 PO) Take 1 tablet by mouth daily      Ascorbic Acid (VITAMIN C PO) Take 1 tablet by mouth daily      VITAMIN D PO Take 1 tablet by mouth daily      Omega-3 Fatty Acids (FISH OIL PO) Take 1 capsule by mouth daily      MAGNESIUM PO Take 1 capsule by mouth daily       No current facility-administered medications for this visit. Patient Active Problem List   Diagnosis    Diabetes mellitus [address]    Mixed hyperlipidemia    Erectile dysfunction    Trigger finger, unspecified finger    Bilateral rotator cuff dysfunction    Stroke-like symptoms    Dizziness       Past Medical History:   Diagnosis Date    Diabetes mellitus type 2 in obese ([address]     Diverticulitis     Hyperlipidemia        History reviewed. No pertinent surgical history.     No Known Allergies    Social History     Socioeconomic History    Marital status:      Spouse name: None    Number of children: None    Years of education: None    Highest education level: None   Occupational History    None   Tobacco Use    Smoking status: Current Every Day Smoker     Packs/day: 0.25     Years: 43.00     Pack years: 10.75     Start date: 0    Smokeless tobacco: Never Used   Vaping Use    Vaping Use: Never used   Substance and Sexual Activity    Alcohol use: Not Currently    Drug use: Never    Sexual activity: None   Other Topics Concern    None   Social History Narrative    None     Social Determinants of Health     Financial Resource Strain: Low Risk     Difficulty of Paying Living Expenses: Not hard at all   Food Insecurity: No Food Insecurity    Worried About Running Out of Food in the Last Year: Never true    Chanda of Food in the Last Year: Never true   Transportation Needs:     Lack of Transportation (Medical): Not on file    Lack of Transportation (Non-Medical): Not on file   Physical Activity:     Days of Exercise per Week: Not on file    Minutes of Exercise per Session: Not on file   Stress:     Feeling of Stress : Not on file   Social Connections:     Frequency of Communication with Friends and Family: Not on file    Frequency of Social Gatherings with Friends and Family: Not on file    Attends Buddhist Services: Not on file    Active Member of [address] [place] [address] or Organizations: Not on file    Attends Club or Organization Meetings: Not on file    Marital Status: Not on file   Intimate Partner Violence:     Fear of Current or Ex-Partner: Not on file    Emotionally Abused: Not on file    Physically Abused: Not on file    Sexually Abused: Not on file   Housing Stability:     Unable to Pay for Housing in the Last Year: Not on file    Number of [place] in the Last Year: Not on file    Unstable Housing in the Last Year: Not on file       Family History   Problem Relation Age of Onset    Stroke Mother     Diabetes Mother     Heart Attack Mother 67    Cancer Father         leukemia    Cancer Sister         leukemia         Review of Systems  As follows except as previously noted in HPI:  Constitutional: Negative for chills, diaphoresis, fatigue, fever and unexpected weight change. Respiratory: Negative for cough, shortness of breath and wheezing.

## 2022-05-11 DIAGNOSIS — R29.90 STROKE-LIKE SYMPTOMS: ICD-10-CM

## 2022-05-11 DIAGNOSIS — E78.2 MIXED HYPERLIPIDEMIA: ICD-10-CM

## 2022-05-11 DIAGNOSIS — E11.69 TYPE 2 DIABETES MELLITUS WITH OTHER SPECIFIED COMPLICATION, WITHOUT LONG-TERM CURRENT USE OF INSULIN (HCC): ICD-10-CM

## 2022-05-11 RX ORDER — ATORVASTATIN CALCIUM 20 MG/1
20 TABLET, FILM COATED ORAL DAILY
Qty: 90 TABLET | Refills: 3 | Status: SHIPPED
Start: 2022-05-11 | End: 2022-09-15 | Stop reason: SDUPTHER

## 2022-06-27 DIAGNOSIS — E11.69 TYPE 2 DIABETES MELLITUS WITH OTHER SPECIFIED COMPLICATION, WITHOUT LONG-TERM CURRENT USE OF INSULIN (HCC): ICD-10-CM

## 2022-06-30 ENCOUNTER — OFFICE VISIT (OUTPATIENT)
Dept: PRIMARY CARE CLINIC | Age: 62
End: 2022-06-30
Payer: COMMERCIAL

## 2022-06-30 VITALS
TEMPERATURE: 97.8 F | HEIGHT: 70 IN | DIASTOLIC BLOOD PRESSURE: 70 MMHG | BODY MASS INDEX: 31.64 KG/M2 | SYSTOLIC BLOOD PRESSURE: 110 MMHG | WEIGHT: 221 LBS | HEART RATE: 80 BPM | OXYGEN SATURATION: 97 %

## 2022-06-30 DIAGNOSIS — R29.90 STROKE-LIKE SYMPTOMS: ICD-10-CM

## 2022-06-30 DIAGNOSIS — E11.69 TYPE 2 DIABETES MELLITUS WITH OTHER SPECIFIED COMPLICATION, WITHOUT LONG-TERM CURRENT USE OF INSULIN (HCC): Primary | ICD-10-CM

## 2022-06-30 DIAGNOSIS — G56.03 BILATERAL CARPAL TUNNEL SYNDROME: ICD-10-CM

## 2022-06-30 DIAGNOSIS — E66.09 CLASS 1 OBESITY DUE TO EXCESS CALORIES WITH SERIOUS COMORBIDITY AND BODY MASS INDEX (BMI) OF 32.0 TO 32.9 IN ADULT: ICD-10-CM

## 2022-06-30 DIAGNOSIS — E55.9 VITAMIN D DEFICIENCY: ICD-10-CM

## 2022-06-30 DIAGNOSIS — Q21.12 PATENT FORAMEN OVALE WITH RIGHT TO LEFT SHUNT: ICD-10-CM

## 2022-06-30 DIAGNOSIS — N52.9 ERECTILE DYSFUNCTION, UNSPECIFIED ERECTILE DYSFUNCTION TYPE: ICD-10-CM

## 2022-06-30 DIAGNOSIS — E78.2 MIXED HYPERLIPIDEMIA: ICD-10-CM

## 2022-06-30 DIAGNOSIS — E11.69 TYPE 2 DIABETES MELLITUS WITH OTHER SPECIFIED COMPLICATION, WITHOUT LONG-TERM CURRENT USE OF INSULIN (HCC): ICD-10-CM

## 2022-06-30 LAB
ALBUMIN SERPL-MCNC: 4.7 G/DL (ref 3.5–5.2)
ALP BLD-CCNC: 69 U/L (ref 40–129)
ALT SERPL-CCNC: 37 U/L (ref 0–40)
ANION GAP SERPL CALCULATED.3IONS-SCNC: 15 MMOL/L (ref 7–16)
AST SERPL-CCNC: 21 U/L (ref 0–39)
BASOPHILS ABSOLUTE: 0.04 E9/L (ref 0–0.2)
BASOPHILS RELATIVE PERCENT: 0.5 % (ref 0–2)
BILIRUB SERPL-MCNC: 0.5 MG/DL (ref 0–1.2)
BUN BLDV-MCNC: 14 MG/DL (ref 6–23)
CALCIUM SERPL-MCNC: 9.6 MG/DL (ref 8.6–10.2)
CHLORIDE BLD-SCNC: 99 MMOL/L (ref 98–107)
CHOLESTEROL, TOTAL: 107 MG/DL (ref 0–199)
CO2: 22 MMOL/L (ref 22–29)
CREAT SERPL-MCNC: 0.7 MG/DL (ref 0.7–1.2)
CREATININE URINE: 30 MG/DL (ref 40–278)
EOSINOPHILS ABSOLUTE: 0.11 E9/L (ref 0.05–0.5)
EOSINOPHILS RELATIVE PERCENT: 1.5 % (ref 0–6)
GFR AFRICAN AMERICAN: >60
GFR NON-AFRICAN AMERICAN: >60 ML/MIN/1.73
GLUCOSE BLD-MCNC: 256 MG/DL (ref 74–99)
HBA1C MFR BLD: 9.1 %
HCT VFR BLD CALC: 45.2 % (ref 37–54)
HDLC SERPL-MCNC: 40 MG/DL
HEMOGLOBIN: 15.5 G/DL (ref 12.5–16.5)
IMMATURE GRANULOCYTES #: 0.02 E9/L
IMMATURE GRANULOCYTES %: 0.3 % (ref 0–5)
LDL CHOLESTEROL CALCULATED: 42 MG/DL (ref 0–99)
LYMPHOCYTES ABSOLUTE: 2.13 E9/L (ref 1.5–4)
LYMPHOCYTES RELATIVE PERCENT: 28.3 % (ref 20–42)
MCH RBC QN AUTO: 32.2 PG (ref 26–35)
MCHC RBC AUTO-ENTMCNC: 34.3 % (ref 32–34.5)
MCV RBC AUTO: 93.8 FL (ref 80–99.9)
MICROALBUMIN UR-MCNC: <12 MG/L
MICROALBUMIN/CREAT UR-RTO: ABNORMAL (ref 0–30)
MONOCYTES ABSOLUTE: 0.61 E9/L (ref 0.1–0.95)
MONOCYTES RELATIVE PERCENT: 8.1 % (ref 2–12)
NEUTROPHILS ABSOLUTE: 4.61 E9/L (ref 1.8–7.3)
NEUTROPHILS RELATIVE PERCENT: 61.3 % (ref 43–80)
PDW BLD-RTO: 12.5 FL (ref 11.5–15)
PLATELET # BLD: 239 E9/L (ref 130–450)
PMV BLD AUTO: 10.2 FL (ref 7–12)
POTASSIUM SERPL-SCNC: 4.4 MMOL/L (ref 3.5–5)
RBC # BLD: 4.82 E12/L (ref 3.8–5.8)
SODIUM BLD-SCNC: 136 MMOL/L (ref 132–146)
TOTAL PROTEIN: 7.2 G/DL (ref 6.4–8.3)
TRIGL SERPL-MCNC: 126 MG/DL (ref 0–149)
VITAMIN D 25-HYDROXY: 55 NG/ML (ref 30–100)
VLDLC SERPL CALC-MCNC: 25 MG/DL
WBC # BLD: 7.5 E9/L (ref 4.5–11.5)

## 2022-06-30 PROCEDURE — 99214 OFFICE O/P EST MOD 30 MIN: CPT | Performed by: FAMILY MEDICINE

## 2022-06-30 PROCEDURE — 83036 HEMOGLOBIN GLYCOSYLATED A1C: CPT | Performed by: FAMILY MEDICINE

## 2022-06-30 PROCEDURE — 4004F PT TOBACCO SCREEN RCVD TLK: CPT | Performed by: FAMILY MEDICINE

## 2022-06-30 PROCEDURE — G8427 DOCREV CUR MEDS BY ELIG CLIN: HCPCS | Performed by: FAMILY MEDICINE

## 2022-06-30 PROCEDURE — 3017F COLORECTAL CA SCREEN DOC REV: CPT | Performed by: FAMILY MEDICINE

## 2022-06-30 PROCEDURE — 2022F DILAT RTA XM EVC RTNOPTHY: CPT | Performed by: FAMILY MEDICINE

## 2022-06-30 PROCEDURE — 3046F HEMOGLOBIN A1C LEVEL >9.0%: CPT | Performed by: FAMILY MEDICINE

## 2022-06-30 PROCEDURE — G8417 CALC BMI ABV UP PARAM F/U: HCPCS | Performed by: FAMILY MEDICINE

## 2022-06-30 ASSESSMENT — ENCOUNTER SYMPTOMS
RHINORRHEA: 0
DIARRHEA: 0
CONSTIPATION: 0
WHEEZING: 0
VOMITING: 0
SORE THROAT: 0
SHORTNESS OF BREATH: 0
NAUSEA: 0
ABDOMINAL PAIN: 0

## 2022-06-30 NOTE — PROGRESS NOTES
2022     Chief Complaint   Patient presents with    Diabetes    Follow-up     pt states that he is feeling fine    Discuss Medications     pt states that dulaglutide is $700 2 weeks ago      RAÚL Lucas (:  1960) is a 60 y.o. male, here for evaluation of the following medical concerns:    Patient is a 79-year-old male who has a past medical history of type II diabetes, Felipa Hawk who presents to f/u his chronic medical issues.     Labs completed today. Awaiting results.      Trigger Finger, Pain, Swelling in hands. Report numbness.  Recently saw Ortho. Received an injection for his carpal tunnel syndrome. Significantly improved. Plans for surgery this fall. Neck/Shoulder pain issues: Pain worsening over the past year. Radicular pain in to his neck and arms. Mainly on the right.  Stable currently.     DM: Patient reports his blood sugars at home have been in the 120-160 range.  No low blood sugars.  Patient has been tolerating his medications well. GLP-1 medication started after his last apt. A1C improved to 9.1. No longer can afford injectable GLP-1 medication. Needs alternative.      History of hyperlipidemia: Patient is on Lipitor 20 mg daily.  Tolerates medication well without issue.     Erectile dysfunction: Cialis prn     Strokelike symptoms with dizziness.: Patient was in the emergency department as noted at his last office appointment. Herson Sarabia did see neurology. Ambar Beebe was completed in the ER.  Patient was not fully admitted.      Patient was sent to Cardiology and cleared from their standpoint. Holter monitor: Essentially normal.  Moderate burden of PACs present. Echo: PFO w r-->L shunting. Stress test was low risk. Cardiology wanted neurology input in regards for embolic stroke concerns. Has not seen neurology.      BMI 31    Review of Systems   Constitutional: Negative for chills and fever. HENT: Negative for congestion, rhinorrhea and sore throat.     Respiratory: Negative for shortness of breath and wheezing. Cardiovascular: Negative for chest pain and leg swelling. Gastrointestinal: Negative for abdominal pain, constipation, diarrhea, nausea and vomiting. Skin: Negative for rash. Neurological: Negative for light-headedness and headaches. Past Medical History:   Diagnosis Date    Diabetes mellitus type 2 in obese ([address]     Diverticulitis     Hyperlipidemia        Prior to Visit Medications    Medication Sig Taking?  Authorizing Provider   Semaglutide 3 MG TABS Take 3 mg by mouth daily Yes Scarlett López MD   Semaglutide 7 MG TABS Take 7 mg by mouth daily Yes Scarlett López MD   Semaglutide 14 MG TABS Take 14 mg by mouth daily Yes Scarlett López MD   atorvastatin (LIPITOR) 20 MG tablet Take 1 tablet by mouth daily Yes Scarlett López MD   metFORMIN (GLUCOPHAGE-XR) 500 MG extended release tablet Take 2 tablets by mouth 2 times daily Yes Scarlett López MD   glimepiride (AMARYL) 4 MG tablet Take 1 tablet by mouth 2 times daily Yes Scarlett López MD   aspirin EC 81 MG EC tablet Take 1 tablet by mouth daily Yes Scarlett López MD   VITAMIN E PO Take 1 capsule by mouth daily Yes Historical Provider, MD   Cyanocobalamin (VITAMIN B 12 PO) Take 1 tablet by mouth daily Yes Historical Provider, MD   Ascorbic Acid (VITAMIN C PO) Take 1 tablet by mouth daily Yes Historical Provider, MD   VITAMIN D PO Take 1 tablet by mouth daily Yes Historical Provider, MD   MAGNESIUM PO Take 1 capsule by mouth daily Yes Historical Provider, MD        No Known Allergies    Social History     Tobacco Use    Smoking status: Current Every Day Smoker     Packs/day: 0.25     Years: 43.00     Pack years: 10.75     Start date: 1977    Smokeless tobacco: Never Used   Substance Use Topics    Alcohol use: Not Currently           Vitals:    06/30/22 1616   BP: 110/70   Pulse: 80   Temp: 97.8 °F (36.6 °C)   SpO2: 97%   Weight: 221 lb (100.2 kg)   Height: 5' 10\" (1.778 m) Estimated body mass index is 31.71 kg/m² as calculated from the following:    Height as of this encounter: 5' 10\" (1.778 m). Weight as of this encounter: 221 lb (100.2 kg). Physical Exam  Constitutional:       Appearance: He is well-developed. HENT:      Head: Normocephalic. Eyes:      Extraocular Movements: Extraocular movements intact. Conjunctiva/sclera: Conjunctivae normal.   Cardiovascular:      Rate and Rhythm: Normal rate and regular rhythm. Heart sounds: Normal heart sounds. No murmur heard. Pulmonary:      Effort: Pulmonary effort is normal.      Breath sounds: Normal breath sounds. No wheezing or rales. Abdominal:      General: Bowel sounds are normal.      Palpations: Abdomen is soft. Tenderness: There is no abdominal tenderness. Musculoskeletal:      Right lower leg: No edema. Left lower leg: No edema. Neurological:      General: No focal deficit present. Mental Status: He is alert. Comments: Cranial nerves grossly intact   Psychiatric:         Mood and Affect: Mood normal.         Judgment: Judgment normal.         ASSESSMENT/PLAN:  Alpesh Vasquez was seen today for diabetes, follow-up and discuss medications. Diagnoses and all orders for this visit:    Type 2 diabetes mellitus with other specified complication, without long-term current use of insulin (HCC)  -     POCT glycosylated hemoglobin (Hb A1C)  -     CBC with Auto Differential; Future  -     Comprehensive Metabolic Panel; Future  -     Hemoglobin A1C; Future  A1c has improved from approximately mid 11 is down to 9.1%. However, patient is unable to afford his injectable GLP-1 medication. In regards to this we will try the patient on a oral version as he will be able to obtain a discount card. Patient to continue his sulfonylurea and metformin. Patient will need yearly diabetic eye and foot exams. Mixed hyperlipidemia  -     Lipid Panel;  Future  Tolerating statin well without any issues. Vitamin D deficiency  Continue vitamin D supplementation. Bilateral carpal tunnel syndrome  Patient had improvement of his symptoms with steroid injection. Plans for surgery this fall after [employer] season is over. Erectile dysfunction, unspecified erectile dysfunction type  Cialis as needed. Tolerating well without any issues. Class 1 obesity due to excess calories with serious comorbidity and body mass index (BMI) of 32.0 to 32.9 in adult  Lifestyle modifications advisable. Stroke-like symptoms  Patent foramen ovale with right to left shunt  Cleared by cardiology. Patient still needs to follow-up with neurology in regards to this issue. On 81 mg of aspirin daily. Other orders  -     Semaglutide 3 MG TABS; Take 3 mg by mouth daily  -     Semaglutide 7 MG TABS; Take 7 mg by mouth daily  -     Semaglutide 14 MG TABS; Take 14 mg by mouth daily      Return in about 4 months (around 10/30/2022) for Routine Follow-up of Chronic Conditions. An electronicsignature was used to authenticate this note.     --Sunday MD Breana on 6/30/22 at 2:28 PM EDT

## 2022-07-18 ENCOUNTER — OFFICE VISIT (OUTPATIENT)
Dept: FAMILY MEDICINE CLINIC | Age: 62
End: 2022-07-18
Payer: COMMERCIAL

## 2022-07-18 VITALS
DIASTOLIC BLOOD PRESSURE: 74 MMHG | OXYGEN SATURATION: 96 % | TEMPERATURE: 97.3 F | HEIGHT: 70 IN | BODY MASS INDEX: 31.21 KG/M2 | WEIGHT: 218 LBS | HEART RATE: 76 BPM | SYSTOLIC BLOOD PRESSURE: 118 MMHG | RESPIRATION RATE: 20 BRPM

## 2022-07-18 DIAGNOSIS — J01.90 ACUTE BACTERIAL SINUSITIS: Primary | ICD-10-CM

## 2022-07-18 DIAGNOSIS — R05.9 COUGH: ICD-10-CM

## 2022-07-18 DIAGNOSIS — B96.89 ACUTE BACTERIAL SINUSITIS: Primary | ICD-10-CM

## 2022-07-18 PROCEDURE — 3017F COLORECTAL CA SCREEN DOC REV: CPT | Performed by: FAMILY MEDICINE

## 2022-07-18 PROCEDURE — G8427 DOCREV CUR MEDS BY ELIG CLIN: HCPCS | Performed by: FAMILY MEDICINE

## 2022-07-18 PROCEDURE — G8417 CALC BMI ABV UP PARAM F/U: HCPCS | Performed by: FAMILY MEDICINE

## 2022-07-18 PROCEDURE — 4004F PT TOBACCO SCREEN RCVD TLK: CPT | Performed by: FAMILY MEDICINE

## 2022-07-18 PROCEDURE — 99213 OFFICE O/P EST LOW 20 MIN: CPT | Performed by: FAMILY MEDICINE

## 2022-07-18 RX ORDER — AMOXICILLIN AND CLAVULANATE POTASSIUM 875; 125 MG/1; MG/1
1 TABLET, FILM COATED ORAL 2 TIMES DAILY
Qty: 20 TABLET | Refills: 0 | Status: SHIPPED | OUTPATIENT
Start: 2022-07-18 | End: 2022-07-28

## 2022-07-18 RX ORDER — GUAIFENESIN AND CODEINE PHOSPHATE 100; 10 MG/5ML; MG/5ML
5 SOLUTION ORAL EVERY 4 HOURS PRN
Qty: 237 ML | Refills: 0 | Status: SHIPPED | OUTPATIENT
Start: 2022-07-18 | End: 2022-07-26

## 2022-07-18 RX ORDER — METHYLPREDNISOLONE 4 MG/1
TABLET ORAL
Qty: 1 KIT | Refills: 0 | Status: SHIPPED | OUTPATIENT
Start: 2022-07-18

## 2022-07-18 ASSESSMENT — ENCOUNTER SYMPTOMS
EYES NEGATIVE: 1
TROUBLE SWALLOWING: 0
COUGH: 1
SINUS PAIN: 1
SORE THROAT: 0
GASTROINTESTINAL NEGATIVE: 1
SINUS PRESSURE: 1

## 2022-07-18 NOTE — PROGRESS NOTES
Yarelis Jo (:  1960) is a 60 y.o. male,Established patient, here for evaluation of the following chief complaint(s):  Sinus Problem (Had had for 2 weeks, thinks is getting bronchitis) and Congestion        Assessment/Plan:   Diagnosis Orders   1. Acute bacterial sinusitis  amoxicillin-clavulanate (AUGMENTIN) 875-125 MG per tablet    methylPREDNISolone (MEDROL DOSEPACK) 4 MG tablet    guaiFENesin-codeine (CHERATUSSIN AC) 100-10 MG/5ML syrup      2. Cough  amoxicillin-clavulanate (AUGMENTIN) 875-125 MG per tablet    methylPREDNISolone (MEDROL DOSEPACK) 4 MG tablet    guaiFENesin-codeine (CHERATUSSIN AC) 100-10 MG/5ML syrup        At this time we will treat symptomatically. Red flags discussed with patient. These occur he is to go directly to the nearest emergency department. Patient voiced understanding. This document may have been prepared at least partially through the use of voice recognition software. Although effort is taken to assure the accuracy of this document, it is possible that grammatical, syntax,  or spelling errors may occur. No follow-ups on file. Subjective   SUBJECTIVE/OBJECTIVE:  HPI  Patient presents today for 2-week history of worsening sinus congestion, facial pain, and mildly productive cough. Denies any fever or chills. Denies any chest pain or shortness of breath. Denies any nausea vomiting or diarrhea. Denies any loss of taste or smell. Review of Systems   Constitutional:  Negative for fever. HENT:  Positive for congestion, postnasal drip, sinus pressure and sinus pain. Negative for sore throat and trouble swallowing. Eyes: Negative. Respiratory:  Positive for cough. Cardiovascular: Negative. Gastrointestinal: Negative. Musculoskeletal:  Negative for neck pain. Skin:  Negative for rash. Neurological:  Negative for headaches. Hematological:  Negative for adenopathy. All other systems reviewed and are negative.        Current Outpatient Medications:     amoxicillin-clavulanate (AUGMENTIN) 875-125 MG per tablet, Take 1 tablet by mouth in the morning and 1 tablet before bedtime. Do all this for 10 days. , Disp: 20 tablet, Rfl: 0    methylPREDNISolone (MEDROL DOSEPACK) 4 MG tablet, Take by mouth., Disp: 1 kit, Rfl: 0    guaiFENesin-codeine (CHERATUSSIN AC) 100-10 MG/5ML syrup, Take 5 mLs by mouth every 4 hours as needed for Cough for up to 8 days. , Disp: 237 mL, Rfl: 0    Semaglutide 3 MG TABS, Take 3 mg by mouth daily, Disp: 30 tablet, Rfl: 0    [START ON 7/28/2022] Semaglutide 7 MG TABS, Take 7 mg by mouth daily, Disp: 30 tablet, Rfl: 0    [START ON 8/25/2022] Semaglutide 14 MG TABS, Take 14 mg by mouth daily, Disp: 90 tablet, Rfl: 1    atorvastatin (LIPITOR) 20 MG tablet, Take 1 tablet by mouth daily, Disp: 90 tablet, Rfl: 3    metFORMIN (GLUCOPHAGE-XR) 500 MG extended release tablet, Take 2 tablets by mouth 2 times daily, Disp: 360 tablet, Rfl: 1    glimepiride (AMARYL) 4 MG tablet, Take 1 tablet by mouth 2 times daily, Disp: 180 tablet, Rfl: 1    aspirin EC 81 MG EC tablet, Take 1 tablet by mouth daily, Disp: 90 tablet, Rfl: 1    VITAMIN E PO, Take 1 capsule by mouth daily, Disp: , Rfl:     Cyanocobalamin (VITAMIN B 12 PO), Take 1 tablet by mouth daily, Disp: , Rfl:     Ascorbic Acid (VITAMIN C PO), Take 1 tablet by mouth daily, Disp: , Rfl:     VITAMIN D PO, Take 1 tablet by mouth daily, Disp: , Rfl:     MAGNESIUM PO, Take 1 capsule by mouth daily, Disp: , Rfl:    Patient Active Problem List   Diagnosis    Diabetes mellitus ([address]    Mixed hyperlipidemia    Erectile dysfunction    Trigger finger, unspecified finger    Bilateral rotator cuff dysfunction    Stroke-like symptoms    Dizziness     Past Medical History:   Diagnosis Date    Diabetes mellitus type 2 in obese (HCC)     Diverticulitis     Hyperlipidemia      No past surgical history on file.   Social History     Socioeconomic History    Marital status:      Spouse name: Not on file    Number of children: Not on file    Years of education: Not on file    Highest education level: Not on file   Occupational History    Not on file   Tobacco Use    Smoking status: Every Day     Packs/day: 0.25     Years: 43.00     Pack years: 10.75     Types: Cigarettes     Start date: 0    Smokeless tobacco: Never   Vaping Use    Vaping Use: Never used   Substance and Sexual Activity    Alcohol use: Not Currently    Drug use: Never    Sexual activity: Not on file   Other Topics Concern    Not on file   Social History Narrative    Not on file     Social Determinants of Health     Financial Resource Strain: Low Risk     Difficulty of Paying Living Expenses: Not hard at all   Food Insecurity: No Food Insecurity    Worried About Running Out of Food in the Last Year: Never true    Ran Out of Food in the Last Year: Never true   Transportation Needs: Not on file   Physical Activity: Not on file   Stress: Not on file   Social Connections: Not on file   Intimate Partner Violence: Not on file   Housing Stability: Not on file     Family History   Problem Relation Age of Onset    Stroke Mother     Diabetes Mother     Heart Attack Mother 67    Cancer Father         leukemia    Cancer Sister         leukemia      There are no preventive care reminders to display for this patient. There are no preventive care reminders to display for this patient.    Diabetes Management   Topic Date Due    Diabetic retinal exam  Never done    Diabetic foot exam  01/08/2021      Health Maintenance Due   Topic    Shingles vaccine (1 of 2)      Health Maintenance   Topic Date Due    COVID-19 Vaccine (1) Never done    Pneumococcal 0-64 years Vaccine (1 - PCV) Never done    Diabetic retinal exam  Never done    Shingles vaccine (1 of 2) Never done    Diabetic foot exam  01/08/2021    Prostate Specific Antigen (PSA) Screening or Monitoring  11/12/2021    Flu vaccine (1) 09/01/2022    A1C test (Diabetic or Prediabetic)  09/30/2022 Depression Screen  02/28/2023    Diabetic microalbuminuria test  06/30/2023    Lipids  06/30/2023    Colorectal Cancer Screen  09/17/2024    DTaP/Tdap/Td vaccine (2 - Td or Tdap) 11/11/2030    Hepatitis A vaccine  Aged Out    Hib vaccine  Aged Out    Meningococcal (ACWY) vaccine  Aged Out    Hepatitis C screen  Discontinued    HIV screen  Discontinued      Health Maintenance Due   Topic Date Due    Prostate Specific Antigen (PSA) Screening or Monitoring  11/12/2021      There are no preventive care reminders to display for this patient. /74 (Site: Right Upper Arm, Position: Sitting, Cuff Size: Medium Adult)   Pulse 76   Temp 97.3 °F (36.3 °C) (Temporal)   Resp 20   Ht 5' 10\" (1.778 m)   Wt 218 lb (98.9 kg)   SpO2 96%   BMI 31.28 kg/m²     Objective   Physical Exam  Vitals reviewed. Constitutional:       Appearance: He is well-developed. He is obese. He is not ill-appearing. HENT:      Head: Normocephalic and atraumatic. Right Ear: External ear normal. A middle ear effusion is present. Tympanic membrane is bulging. Left Ear: External ear normal. A middle ear effusion is present. Tympanic membrane is bulging. Nose: Nose normal.   Eyes:      Conjunctiva/sclera: Conjunctivae normal.      Pupils: Pupils are equal, round, and reactive to light. Cardiovascular:      Rate and Rhythm: Normal rate and regular rhythm. Heart sounds: Normal heart sounds. Pulmonary:      Effort: Pulmonary effort is normal.      Breath sounds: Decreased breath sounds and wheezing present. Abdominal:      General: Bowel sounds are normal.      Palpations: Abdomen is soft. Musculoskeletal:         General: Normal range of motion. Cervical back: Normal range of motion and neck supple. Skin:     General: Skin is warm and dry. Findings: No erythema. Neurological:      Mental Status: He is alert and oriented to person, place, and time. Cranial Nerves: No cranial nerve deficit. Psychiatric:         Behavior: Behavior normal.         Judgment: Judgment normal.                An electronic signature was used to authenticate this note.     --Sylvia Hernandez, DO

## 2022-09-15 DIAGNOSIS — E11.69 TYPE 2 DIABETES MELLITUS WITH OTHER SPECIFIED COMPLICATION, WITHOUT LONG-TERM CURRENT USE OF INSULIN (HCC): ICD-10-CM

## 2022-09-15 DIAGNOSIS — R29.90 STROKE-LIKE SYMPTOMS: ICD-10-CM

## 2022-09-15 DIAGNOSIS — E78.2 MIXED HYPERLIPIDEMIA: ICD-10-CM

## 2022-09-15 RX ORDER — ATORVASTATIN CALCIUM 20 MG/1
20 TABLET, FILM COATED ORAL DAILY
Qty: 90 TABLET | Refills: 3 | Status: SHIPPED | OUTPATIENT
Start: 2022-09-15 | End: 2023-09-10

## 2022-09-15 RX ORDER — METFORMIN HYDROCHLORIDE 500 MG/1
1000 TABLET, EXTENDED RELEASE ORAL 2 TIMES DAILY
Qty: 360 TABLET | Refills: 1 | Status: SHIPPED | OUTPATIENT
Start: 2022-09-15 | End: 2023-03-14

## 2022-12-06 DIAGNOSIS — E11.69 TYPE 2 DIABETES MELLITUS WITH OTHER SPECIFIED COMPLICATION, WITHOUT LONG-TERM CURRENT USE OF INSULIN (HCC): ICD-10-CM

## 2022-12-06 DIAGNOSIS — E78.2 MIXED HYPERLIPIDEMIA: ICD-10-CM

## 2022-12-06 LAB
ALBUMIN SERPL-MCNC: 4.4 G/DL (ref 3.5–5.2)
ALP BLD-CCNC: 69 U/L (ref 40–129)
ALT SERPL-CCNC: 22 U/L (ref 0–40)
ANION GAP SERPL CALCULATED.3IONS-SCNC: 14 MMOL/L (ref 7–16)
AST SERPL-CCNC: 14 U/L (ref 0–39)
BASOPHILS ABSOLUTE: 0.03 E9/L (ref 0–0.2)
BASOPHILS RELATIVE PERCENT: 0.4 % (ref 0–2)
BILIRUB SERPL-MCNC: 0.4 MG/DL (ref 0–1.2)
BUN BLDV-MCNC: 12 MG/DL (ref 6–23)
CALCIUM SERPL-MCNC: 9.6 MG/DL (ref 8.6–10.2)
CHLORIDE BLD-SCNC: 101 MMOL/L (ref 98–107)
CHOLESTEROL, TOTAL: 98 MG/DL (ref 0–199)
CO2: 24 MMOL/L (ref 22–29)
CREAT SERPL-MCNC: 0.7 MG/DL (ref 0.7–1.2)
EOSINOPHILS ABSOLUTE: 0.11 E9/L (ref 0.05–0.5)
EOSINOPHILS RELATIVE PERCENT: 1.4 % (ref 0–6)
GFR SERPL CREATININE-BSD FRML MDRD: >60 ML/MIN/1.73
GLUCOSE BLD-MCNC: 278 MG/DL (ref 74–99)
HBA1C MFR BLD: 11 % (ref 4–5.6)
HCT VFR BLD CALC: 48.6 % (ref 37–54)
HDLC SERPL-MCNC: 40 MG/DL
HEMOGLOBIN: 16 G/DL (ref 12.5–16.5)
IMMATURE GRANULOCYTES #: 0.02 E9/L
IMMATURE GRANULOCYTES %: 0.3 % (ref 0–5)
LDL CHOLESTEROL CALCULATED: 31 MG/DL (ref 0–99)
LYMPHOCYTES ABSOLUTE: 2.03 E9/L (ref 1.5–4)
LYMPHOCYTES RELATIVE PERCENT: 25.8 % (ref 20–42)
MCH RBC QN AUTO: 31.6 PG (ref 26–35)
MCHC RBC AUTO-ENTMCNC: 32.9 % (ref 32–34.5)
MCV RBC AUTO: 95.9 FL (ref 80–99.9)
MONOCYTES ABSOLUTE: 0.59 E9/L (ref 0.1–0.95)
MONOCYTES RELATIVE PERCENT: 7.5 % (ref 2–12)
NEUTROPHILS ABSOLUTE: 5.1 E9/L (ref 1.8–7.3)
NEUTROPHILS RELATIVE PERCENT: 64.6 % (ref 43–80)
PDW BLD-RTO: 12.8 FL (ref 11.5–15)
PLATELET # BLD: 215 E9/L (ref 130–450)
PMV BLD AUTO: 10.3 FL (ref 7–12)
POTASSIUM SERPL-SCNC: 5 MMOL/L (ref 3.5–5)
RBC # BLD: 5.07 E12/L (ref 3.8–5.8)
SODIUM BLD-SCNC: 139 MMOL/L (ref 132–146)
TOTAL PROTEIN: 7 G/DL (ref 6.4–8.3)
TRIGL SERPL-MCNC: 136 MG/DL (ref 0–149)
VLDLC SERPL CALC-MCNC: 27 MG/DL
WBC # BLD: 7.9 E9/L (ref 4.5–11.5)

## 2022-12-07 ENCOUNTER — OFFICE VISIT (OUTPATIENT)
Dept: PRIMARY CARE CLINIC | Age: 62
End: 2022-12-07
Payer: COMMERCIAL

## 2022-12-07 VITALS
SYSTOLIC BLOOD PRESSURE: 112 MMHG | OXYGEN SATURATION: 98 % | HEART RATE: 71 BPM | HEIGHT: 70 IN | BODY MASS INDEX: 31.92 KG/M2 | TEMPERATURE: 98.2 F | DIASTOLIC BLOOD PRESSURE: 74 MMHG | RESPIRATION RATE: 18 BRPM | WEIGHT: 223 LBS

## 2022-12-07 DIAGNOSIS — R29.90 STROKE-LIKE SYMPTOMS: ICD-10-CM

## 2022-12-07 DIAGNOSIS — E78.2 MIXED HYPERLIPIDEMIA: ICD-10-CM

## 2022-12-07 DIAGNOSIS — M54.2 CERVICAL PAIN: ICD-10-CM

## 2022-12-07 DIAGNOSIS — M75.82 ROTATOR CUFF TENDINITIS, LEFT: ICD-10-CM

## 2022-12-07 DIAGNOSIS — E66.09 CLASS 1 OBESITY DUE TO EXCESS CALORIES WITH SERIOUS COMORBIDITY AND BODY MASS INDEX (BMI) OF 32.0 TO 32.9 IN ADULT: ICD-10-CM

## 2022-12-07 DIAGNOSIS — E11.69 TYPE 2 DIABETES MELLITUS WITH OTHER SPECIFIED COMPLICATION, WITHOUT LONG-TERM CURRENT USE OF INSULIN (HCC): Primary | ICD-10-CM

## 2022-12-07 DIAGNOSIS — M50.30 DDD (DEGENERATIVE DISC DISEASE), CERVICAL: ICD-10-CM

## 2022-12-07 PROCEDURE — G8484 FLU IMMUNIZE NO ADMIN: HCPCS | Performed by: FAMILY MEDICINE

## 2022-12-07 PROCEDURE — 99214 OFFICE O/P EST MOD 30 MIN: CPT | Performed by: FAMILY MEDICINE

## 2022-12-07 PROCEDURE — 3046F HEMOGLOBIN A1C LEVEL >9.0%: CPT | Performed by: FAMILY MEDICINE

## 2022-12-07 PROCEDURE — 4004F PT TOBACCO SCREEN RCVD TLK: CPT | Performed by: FAMILY MEDICINE

## 2022-12-07 PROCEDURE — 2022F DILAT RTA XM EVC RTNOPTHY: CPT | Performed by: FAMILY MEDICINE

## 2022-12-07 PROCEDURE — G8427 DOCREV CUR MEDS BY ELIG CLIN: HCPCS | Performed by: FAMILY MEDICINE

## 2022-12-07 PROCEDURE — G8417 CALC BMI ABV UP PARAM F/U: HCPCS | Performed by: FAMILY MEDICINE

## 2022-12-07 PROCEDURE — 3017F COLORECTAL CA SCREEN DOC REV: CPT | Performed by: FAMILY MEDICINE

## 2022-12-07 ASSESSMENT — ENCOUNTER SYMPTOMS
DIARRHEA: 0
VOMITING: 0
SHORTNESS OF BREATH: 0
CONSTIPATION: 0
WHEEZING: 0
SORE THROAT: 0
ABDOMINAL PAIN: 0
NAUSEA: 0
RHINORRHEA: 0

## 2022-12-07 NOTE — PROGRESS NOTES
2022     Chief Complaint   Patient presents with    Diabetes       HPI  Rosie Mccullough (:  1960) is a 60 y.o. male, here for evaluation of the following medical concerns:    Patient is a 59-year-old male who has a past medical history of type II diabetes, Efra Morales who presents to f/u his chronic medical issues. Labs: A1C 11%. CBC within normal limits. CMP within normal limits with signs of elevated glucose. Cholesterol appropriate. Trigger Finger, Pain, Swelling in hands. Report numbness. Recently saw Ortho. Received an injection for his carpal tunnel syndrome. Significantly improved. Plans for surgery this fall. Neck/Shoulder pain issues: Pain worsening over the past year. Radicular pain in to his neck and arms. Has been seeing a chiropractor with only minimal improvement. DM: Patient reports his blood sugars at home have been in the 120-160 range. No low blood sugars. Patient has been tolerating his medications well. GLP-1 medication started after his last apt. A1C improved to 9.1. No longer can afford injectable GLP-1 medication. Also unable to afford the oral version of such. Needs alternative. History of hyperlipidemia: Patient is on Lipitor 20 mg daily. Tolerates medication well without issue. Erectile dysfunction: Cialis prn     Strokelike symptoms with dizziness.: Patient was in the emergency department as noted at his last office appointment. Patient did see neurology. Work-up was completed in the ER. Patient was not fully admitted. Had COVID in Oct 2022. Review of Systems   Constitutional:  Negative for chills and fever. HENT:  Negative for congestion, rhinorrhea and sore throat. Respiratory:  Negative for shortness of breath and wheezing. Cardiovascular:  Negative for chest pain and leg swelling. Gastrointestinal:  Negative for abdominal pain, constipation, diarrhea, nausea and vomiting. Skin:  Negative for rash.    Neurological: Negative for light-headedness and headaches. Past Medical History:   Diagnosis Date    Diabetes mellitus type 2 in obese [facility])     Diverticulitis     Hyperlipidemia        Prior to Visit Medications    Medication Sig Taking? Authorizing Provider   SITagliptin (JANUVIA) 100 MG tablet Take 1 tablet by mouth daily Yes Chun John MD   metFORMIN (GLUCOPHAGE-XR) 500 MG extended release tablet Take 2 tablets by mouth 2 times daily Yes Chun John MD   atorvastatin (LIPITOR) 20 MG tablet Take 1 tablet by mouth daily Yes Chun John MD   glimepiride (AMARYL) 4 MG tablet Take 1 tablet by mouth 2 times daily Yes Chun John MD   aspirin EC 81 MG EC tablet Take 1 tablet by mouth daily Yes Chun John MD   VITAMIN E PO Take 1 capsule by mouth daily Yes Historical Provider, MD   Cyanocobalamin (VITAMIN B 12 PO) Take 1 tablet by mouth daily Yes Historical Provider, MD   Ascorbic Acid (VITAMIN C PO) Take 1 tablet by mouth daily Yes Historical Provider, MD   VITAMIN D PO Take 1 tablet by mouth daily Yes Historical Provider, MD   MAGNESIUM PO Take 1 capsule by mouth daily Yes Historical Provider, MD        No Known Allergies    Social History     Tobacco Use    Smoking status: Every Day     Packs/day: 0.25     Years: 43.00     Pack years: 10.75     Types: Cigarettes     Start date: 1977    Smokeless tobacco: Never   Substance Use Topics    Alcohol use: Not Currently           Vitals:    12/07/22 1320   BP: 112/74   Pulse: 71   Resp: 18   Temp: 98.2 °F (36.8 °C)   TempSrc: Temporal   SpO2: 98%   Weight: 223 lb (101.2 kg)   Height: 5' 10\" (1.778 m)     Estimated body mass index is 32 kg/m² as calculated from the following:    Height as of this encounter: 5' 10\" (1.778 m). Weight as of this encounter: 223 lb (101.2 kg). Physical Exam  Constitutional:       Appearance: He is well-developed. HENT:      Head: Normocephalic. Eyes:      Extraocular Movements: Extraocular movements intact. Conjunctiva/sclera: Conjunctivae normal.   Cardiovascular:      Rate and Rhythm: Normal rate and regular rhythm. Heart sounds: Normal heart sounds. No murmur heard. Pulmonary:      Effort: Pulmonary effort is normal.      Breath sounds: Normal breath sounds. No wheezing or rales. Abdominal:      General: Bowel sounds are normal.      Palpations: Abdomen is soft. Tenderness: There is no abdominal tenderness. Musculoskeletal:      Right lower leg: No edema. Left lower leg: No edema. Neurological:      General: No focal deficit present. Mental Status: He is alert. Comments: Cranial nerves grossly intact   Psychiatric:         Mood and Affect: Mood normal.         Judgment: Judgment normal.       ASSESSMENT/PLAN:  Alfredo Cosme was seen today for diabetes. Diagnoses and all orders for this visit:    Type 2 diabetes mellitus with other specified complication, without long-term current use of insulin (HCC)  -     SITagliptin (JANUVIA) 100 MG tablet; Take 1 tablet by mouth daily  -     CBC with Auto Differential; Future  -     Comprehensive Metabolic Panel; Future  -     Hemoglobin A1C; Future  -     Microalbumin / Creatinine Urine Ratio; Future  -     Lipid Panel; Future  Discontinue semaglutide as patient is unable to afford such. We will try the above medication. Side effects reviewed. All questions and concerns answered. Continue patient on glimepiride and metformin. Patient was advised to try to keep him on oral medications. However he may need to start on a nighttime insulin such as Lantus. Labs prior to next appointment. Patient to check blood sugars after starting his new medication in approximately 2 to 3 weeks. Patient: Afterwards with a report of his blood sugar readings. We will make modifications to his medications afterwards as seen necessary. Mixed hyperlipidemia  -     Lipid Panel; Future  Cholesterol appropriate with LDL less than 70.     Cervical pain/DDD (degenerative disc disease), cervical  -     XR CERVICAL SPINE (4-5 VIEWS); Future  -     External Referral To Physical Therapy  Rotator cuff tendinitis, left  -     XR SHOULDER LEFT (MIN 2 VIEWS); Future  -     External Referral To Physical Therapy  Patient to continue to follow with chiropractic care. Referral to physical therapy. X-rays today. Consider referral to specialist and MRI if necessary in the future. Class 1 obesity due to excess calories with serious comorbidity and body mass index (BMI) of 32.0 to 32.9 in adult  Healthy lifestyle habits reviewed and advised. Stroke-like symptoms  No residual symptoms. No return of symptoms. Continue to follow. Return in about 4 months (around 4/7/2023) for To Discuss Labs Results. An [email or web address] was used to authenticate this note.     --Favian Valerio MD on 12/7/22 at 8:53 AM EST

## 2022-12-19 DIAGNOSIS — E11.69 TYPE 2 DIABETES MELLITUS WITH OTHER SPECIFIED COMPLICATION, WITHOUT LONG-TERM CURRENT USE OF INSULIN (HCC): ICD-10-CM

## 2022-12-19 DIAGNOSIS — E78.2 MIXED HYPERLIPIDEMIA: ICD-10-CM

## 2022-12-19 DIAGNOSIS — R29.90 STROKE-LIKE SYMPTOMS: ICD-10-CM

## 2022-12-19 RX ORDER — ATORVASTATIN CALCIUM 20 MG/1
20 TABLET, FILM COATED ORAL DAILY
Qty: 90 TABLET | Refills: 3 | Status: SHIPPED | OUTPATIENT
Start: 2022-12-19 | End: 2023-12-14

## 2022-12-19 RX ORDER — METFORMIN HYDROCHLORIDE 500 MG/1
1000 TABLET, EXTENDED RELEASE ORAL 2 TIMES DAILY
Qty: 360 TABLET | Refills: 1 | Status: SHIPPED | OUTPATIENT
Start: 2022-12-19 | End: 2023-06-17

## 2022-12-19 RX ORDER — GLIMEPIRIDE 4 MG/1
4 TABLET ORAL 2 TIMES DAILY
Qty: 180 TABLET | Refills: 1 | Status: SHIPPED | OUTPATIENT
Start: 2022-12-19

## 2023-02-06 ENCOUNTER — OFFICE VISIT (OUTPATIENT)
Dept: FAMILY MEDICINE CLINIC | Age: 63
End: 2023-02-06
Payer: COMMERCIAL

## 2023-02-06 VITALS
OXYGEN SATURATION: 96 % | HEART RATE: 85 BPM | HEIGHT: 70 IN | TEMPERATURE: 98 F | SYSTOLIC BLOOD PRESSURE: 128 MMHG | BODY MASS INDEX: 31.21 KG/M2 | DIASTOLIC BLOOD PRESSURE: 78 MMHG | WEIGHT: 218 LBS

## 2023-02-06 DIAGNOSIS — J32.9 SINOBRONCHITIS: Primary | ICD-10-CM

## 2023-02-06 DIAGNOSIS — J40 SINOBRONCHITIS: Primary | ICD-10-CM

## 2023-02-06 PROCEDURE — 3017F COLORECTAL CA SCREEN DOC REV: CPT

## 2023-02-06 PROCEDURE — G8484 FLU IMMUNIZE NO ADMIN: HCPCS

## 2023-02-06 PROCEDURE — G8427 DOCREV CUR MEDS BY ELIG CLIN: HCPCS

## 2023-02-06 PROCEDURE — G8417 CALC BMI ABV UP PARAM F/U: HCPCS

## 2023-02-06 PROCEDURE — 4004F PT TOBACCO SCREEN RCVD TLK: CPT

## 2023-02-06 PROCEDURE — 99213 OFFICE O/P EST LOW 20 MIN: CPT

## 2023-02-06 RX ORDER — DOXYCYCLINE HYCLATE 100 MG/1
100 CAPSULE ORAL 2 TIMES DAILY
Qty: 20 CAPSULE | Refills: 0 | Status: SHIPPED | OUTPATIENT
Start: 2023-02-06 | End: 2023-02-16

## 2023-02-06 RX ORDER — DEXTROMETHORPHAN HYDROBROMIDE AND PROMETHAZINE HYDROCHLORIDE 15; 6.25 MG/5ML; MG/5ML
5 SYRUP ORAL 4 TIMES DAILY PRN
Qty: 120 ML | Refills: 0 | Status: SHIPPED | OUTPATIENT
Start: 2023-02-06

## 2023-02-06 RX ORDER — ALBUTEROL SULFATE 90 UG/1
2 AEROSOL, METERED RESPIRATORY (INHALATION) 4 TIMES DAILY PRN
Qty: 18 G | Refills: 0 | Status: SHIPPED | OUTPATIENT
Start: 2023-02-06

## 2023-02-06 NOTE — PROGRESS NOTES
Chief Complaint   Sinusitis (Started a week ago with a head cold now draining into chest.) and Cough    History of Present Illness   Source of history provided by:  patient. Tomas Zuniga is a 58 y.o. old male presenting to the walk in clinic for evaluation of a mixed productive and non-productive cough, chest congestion, and mild intermittent SOB with coughing fits x 7 days. Reports mild nasal congestion, nasal drainage, and sore throat. Has been taking Robitussin OTC without relief. Denies any fever, chills, wheezing, CP, SOB, or GI symptoms. Denies any hx of asthma or COPD. Reports tobacco use. Denies any contact with any individuals with known COVID-19 infection or under investigation for COVID-19 infection. Pt had COVID-19 2 months ago. ROS    Unless otherwise stated in this report or unable to obtain because of the patient's clinical or mental status as evidenced by the medical record, this patients's positive and negative responses for Review of Systems, constitutional, psych, eyes, ENT, cardiovascular, respiratory, gastrointestinal, neurological, genitourinary, musculoskeletal, integument systems and systems related to the presenting problem are either stated in the preceding or were not pertinent or were negative for the symptoms and/or complaints related to the medical problem. Physical Exam         VS:  /78   Pulse 85   Temp 98 °F (36.7 °C)   Ht 5' 10\" (1.778 m)   Wt 218 lb (98.9 kg)   SpO2 96%   BMI 31.28 kg/m²    Oxygen Saturation Interpretation: Normal.    Constitutional:  Alert, development consistent with age. Head: No TTP over the sinuses. Ears:  External Ears: Bilateral pinna normal. TMs translucent without erythema or perforation bilaterally. Canals normal bilaterally without swelling or exudate  Nose:  Mild congestion of the nasal mucosa. Throat: Mild posterior pharyngeal erythema with mild post nasal drip present. No exudate or tonsillar hypertrophy noted. Neck:  Supple. There is no anterior cervical adenopathy. Lungs: CTAB without wheezes, rales, or rhonchi. Cough is harsh and dry. Heart:  Regular rate and rhythm, normal heart sounds, without pathological murmurs, ectopy, gallops, or rubs. Skin:  Normal turgor. Warm, dry, without visible rash. Neurological:  Alert and oriented. Motor functions intact. Responds to verbal commands. Lab / Imaging Results   (All laboratory and radiology results have been personally reviewed by myself)  Labs:  No results found for this visit on 02/06/23. Assessment / Plan     Impression(s):  Ricardo Garrett was seen today for sinusitis and cough. Diagnoses and all orders for this visit:    Sinobronchitis  -     doxycycline hyclate (VIBRAMYCIN) 100 MG capsule; Take 1 capsule by mouth 2 times daily for 10 days  -     albuterol sulfate HFA (VENTOLIN HFA) 108 (90 Base) MCG/ACT inhaler; Inhale 2 puffs into the lungs 4 times daily as needed for Wheezing  -     promethazine-dextromethorphan (PROMETHAZINE-DM) 6.25-15 MG/5ML syrup; Take 5 mLs by mouth 4 times daily as needed for Cough    Disposition:  Disposition: Discharge to home. Script written for doxycycline, albuterol, and promethazine DM, side effects discussed. Increase fluids and rest. Symptomatic relief discussed. F/u PCP in 5-7 days if symptoms persist. ED sooner if symptoms worsen or change. Red flag symptoms discussed. Pt is in agreement with this care plan. All questions answered. MAXIMO Cohen    **This report was transcribed using voice recognition software. Every effort was made to ensure accuracy; however, inadvertent computerized transcription errors may be present.

## 2023-02-15 ENCOUNTER — OFFICE VISIT (OUTPATIENT)
Dept: ORTHOPEDIC SURGERY | Age: 63
End: 2023-02-15

## 2023-02-15 DIAGNOSIS — G56.03 BILATERAL CARPAL TUNNEL SYNDROME: Primary | ICD-10-CM

## 2023-02-15 DIAGNOSIS — G56.22 CUBITAL TUNNEL SYNDROME ON LEFT: ICD-10-CM

## 2023-02-15 RX ORDER — TRIAMCINOLONE ACETONIDE 40 MG/ML
20 INJECTION, SUSPENSION INTRA-ARTICULAR; INTRAMUSCULAR ONCE
Status: COMPLETED | OUTPATIENT
Start: 2023-02-15 | End: 2023-02-15

## 2023-02-15 RX ORDER — BUPIVACAINE HYDROCHLORIDE 2.5 MG/ML
0.5 INJECTION, SOLUTION INFILTRATION; PERINEURAL ONCE
Status: COMPLETED | OUTPATIENT
Start: 2023-02-15 | End: 2023-02-15

## 2023-02-15 RX ADMIN — TRIAMCINOLONE ACETONIDE 20 MG: 40 INJECTION, SUSPENSION INTRA-ARTICULAR; INTRAMUSCULAR at 08:25

## 2023-02-15 RX ADMIN — BUPIVACAINE HYDROCHLORIDE 1.25 MG: 2.5 INJECTION, SOLUTION INFILTRATION; PERINEURAL at 08:24

## 2023-02-15 NOTE — PROGRESS NOTES
New Wrist/Hand Patient Visit     Referring Provider:   No referring provider defined for this encounter. CHIEF COMPLAINT:   Chief Complaint   Patient presents with    Carpal Tunnel     Bilateral CTS  Would like to do inj today and plan for CTR in the near future        HPI:      Elver Naylor is a 58y.o. year old bilateral hand numbness tingling. He has numbness in his thumb index and middle finger on the right and all 5 fingers on the left. He had an EMG performed that shows severe carpal tunnel on the right and moderately severe carpal tunnel and cubital tunnel on the left. He has had injections in the past.  He is hoping to get surgery after golf season. His symptoms wake him up at night. He has weakness and is dropping things. PAST MEDICAL HISTORY  Past Medical History:   Diagnosis Date    Diabetes mellitus type 2 in obese ([address]     Diverticulitis     Hyperlipidemia        PAST SURGICAL HISTORY  History reviewed. No pertinent surgical history.     FAMILY HISTORY   Family History   Problem Relation Age of Onset    Stroke Mother     Diabetes Mother     Heart Attack Mother 67    Cancer Father         leukemia    Cancer Sister         leukemia       SOCIAL HISTORY  Social History     Occupational History    Not on file   Tobacco Use    Smoking status: Every Day     Packs/day: 0.25     Years: 43.00     Pack years: 10.75     Types: Cigarettes     Start date: 1977    Smokeless tobacco: Never   Vaping Use    Vaping Use: Never used   Substance and Sexual Activity    Alcohol use: Not Currently    Drug use: Never    Sexual activity: Not on file       CURRENT MEDICATIONS     Current Outpatient Medications:     albuterol sulfate HFA (VENTOLIN HFA) 108 (90 Base) MCG/ACT inhaler, Inhale 2 puffs into the lungs 4 times daily as needed for Wheezing, Disp: 18 g, Rfl: 0    glimepiride (AMARYL) 4 MG tablet, Take 1 tablet by mouth 2 times daily, Disp: 180 tablet, Rfl: 1    metFORMIN (GLUCOPHAGE-XR) 500 MG extended release tablet, Take 2 tablets by mouth 2 times daily, Disp: 360 tablet, Rfl: 1    atorvastatin (LIPITOR) 20 MG tablet, Take 1 tablet by mouth daily, Disp: 90 tablet, Rfl: 3    aspirin EC 81 MG EC tablet, Take 1 tablet by mouth daily, Disp: 90 tablet, Rfl: 1    MAGNESIUM PO, Take 1 capsule by mouth daily, Disp: , Rfl:     doxycycline hyclate (VIBRAMYCIN) 100 MG capsule, Take 1 capsule by mouth 2 times daily for 10 days, Disp: 20 capsule, Rfl: 0    promethazine-dextromethorphan (PROMETHAZINE-DM) 6.25-15 MG/5ML syrup, Take 5 mLs by mouth 4 times daily as needed for Cough, Disp: 120 mL, Rfl: 0    SITagliptin (JANUVIA) 100 MG tablet, Take 1 tablet by mouth daily (Patient not taking: Reported on 2/15/2023), Disp: 90 tablet, Rfl: 1    VITAMIN E PO, Take 1 capsule by mouth daily (Patient not taking: Reported on 2/15/2023), Disp: , Rfl:     Cyanocobalamin (VITAMIN B 12 PO), Take 1 tablet by mouth daily, Disp: , Rfl:     Ascorbic Acid (VITAMIN C PO), Take 1 tablet by mouth daily, Disp: , Rfl:     VITAMIN D PO, Take 1 tablet by mouth daily (Patient not taking: Reported on 2/15/2023), Disp: , Rfl:     ALLERGIES  No Known Allergies    Controlled Substances Monitoring:        REVIEW OF SYSTEMS:     Constitutional:  Negative for weight loss, fevers, chills, fatigue  Cardiovascular: Negative for chest pain, palpitations  Pulmonary: Negative for shortness of breath, labored breathing, cough  GI: negative for abdominal pain, nausea, vomitting   MSK: per HPI  Skin: negative for rash, open wounds    All other systems reviewed and are negative           PHYSICAL EXAM     There were no vitals filed for this visit. Height:    Weight: [unfilled]  BMI:  There is no height or weight on file to calculate BMI. General: The patient is alert and oriented x 3, appears to be stated age and in no distress. HEENT: head is normocephalic, atraumatic. EOMI. Neck: supple, trachea midline, no thyromegaly   Cardiovascular: peripheral pulses palpable. Normal Capillary refill   Respiratory: breathing unlabored, chest expansion symmetric   Skin: no rash, no open wounds, no erythema  Psych: normal affect; mood stable  Neurologic: gait normal, sensation grossly intact in extremities  MSK:        Hand/Wrist:  Right hand: Atraumatic. Subjective paresthesias in the thumb index and middle finger. Positive Tinel's at the wrist.  Positive Durkan's compression test.  He make a full fist and extend his fingers. 5-5 strength and PIN radial median ulnar nerve. Sensation is intact radial ulnar median nerve distribution to light touch    Left hand and elbow: Positive Tinel's at the elbow and wrist.  Positive Durkan compression test at the wrist.  He has subjective paresthesias in all 5 fingers. He can make a full fist and extend his fingers. He has a trigger finger at the middle finger with catching and locking with flexion. AIN PIN radial median ulnar nerve 5/5 strength. Sensation light touch intact radial ulnar median nerve distribution. IMAGING:     EMG was reviewed independently by myself. Patient has evidence of right severe carpal tunnel syndrome, moderate to severe left carpal tunnel syndrome and moderate to severe left cubital tunnel syndrome. Radiographic findings reviewed with patient    ASSESSMENT  Right severe carpal tunnel syndrome  Left moderate to severe carpal tunnel syndrome  Left cubital tunnel syndrome  Left middle finger trigger finger      PLAN  -Plan discussed in detail with the patient today. We will do injections again at the golf season. I gave him a long discussion about longer these nerves are compressed they can have permanent damage. I recommend getting the surgery done sooner rather than later. He is hoping to schedule this fall. He understands that his nerves may not fully recover with prolonged compression. Plan will be to follow-up when he is ready to schedule surgery. We will do these in a staged fashion.   Procedure will be left carpal tunnel release, left cubital tunnel release, left middle finger trigger finger release. This will be followed about a month later by right carpal tunnel release. He is happy with this plan. All of his questions answered in detail. He will follow-up as needed. Bilateral carpal tunnel injections:  Bilateral carpal tunnels were identified as site of injection. Under aseptic technique after informed consent was obtained 20 mg of Kenalog and 0.5 mL of 0.25% Marcaine were injected into both of his carpal tunnels. He tolerated the procedure well. Sterile dressings were applied.           Calixto Rivas DO  Orthopaedic Surgery   2/15/23   7:58 AM EST

## 2023-03-15 SDOH — HEALTH STABILITY: PHYSICAL HEALTH: ON AVERAGE, HOW MANY MINUTES DO YOU ENGAGE IN EXERCISE AT THIS LEVEL?: 150+ MIN

## 2023-03-15 SDOH — HEALTH STABILITY: PHYSICAL HEALTH: ON AVERAGE, HOW MANY DAYS PER WEEK DO YOU ENGAGE IN MODERATE TO STRENUOUS EXERCISE (LIKE A BRISK WALK)?: 3 DAYS

## 2023-03-16 ENCOUNTER — OFFICE VISIT (OUTPATIENT)
Dept: PRIMARY CARE CLINIC | Age: 63
End: 2023-03-16
Payer: COMMERCIAL

## 2023-03-16 VITALS
HEART RATE: 77 BPM | WEIGHT: 219 LBS | OXYGEN SATURATION: 97 % | DIASTOLIC BLOOD PRESSURE: 70 MMHG | SYSTOLIC BLOOD PRESSURE: 128 MMHG | TEMPERATURE: 98.7 F | BODY MASS INDEX: 31.35 KG/M2 | HEIGHT: 70 IN

## 2023-03-16 DIAGNOSIS — E11.69 TYPE 2 DIABETES MELLITUS WITH OTHER SPECIFIED COMPLICATION, WITHOUT LONG-TERM CURRENT USE OF INSULIN (HCC): ICD-10-CM

## 2023-03-16 DIAGNOSIS — E78.2 MIXED HYPERLIPIDEMIA: ICD-10-CM

## 2023-03-16 DIAGNOSIS — R29.90 STROKE-LIKE SYMPTOMS: ICD-10-CM

## 2023-03-16 PROCEDURE — G8417 CALC BMI ABV UP PARAM F/U: HCPCS | Performed by: FAMILY MEDICINE

## 2023-03-16 PROCEDURE — G8427 DOCREV CUR MEDS BY ELIG CLIN: HCPCS | Performed by: FAMILY MEDICINE

## 2023-03-16 PROCEDURE — 99213 OFFICE O/P EST LOW 20 MIN: CPT | Performed by: FAMILY MEDICINE

## 2023-03-16 PROCEDURE — 3017F COLORECTAL CA SCREEN DOC REV: CPT | Performed by: FAMILY MEDICINE

## 2023-03-16 PROCEDURE — 4004F PT TOBACCO SCREEN RCVD TLK: CPT | Performed by: FAMILY MEDICINE

## 2023-03-16 PROCEDURE — 3046F HEMOGLOBIN A1C LEVEL >9.0%: CPT | Performed by: FAMILY MEDICINE

## 2023-03-16 PROCEDURE — G8484 FLU IMMUNIZE NO ADMIN: HCPCS | Performed by: FAMILY MEDICINE

## 2023-03-16 PROCEDURE — 2022F DILAT RTA XM EVC RTNOPTHY: CPT | Performed by: FAMILY MEDICINE

## 2023-03-16 RX ORDER — GLIMEPIRIDE 4 MG/1
4 TABLET ORAL 2 TIMES DAILY
Qty: 180 TABLET | Refills: 1 | Status: SHIPPED | OUTPATIENT
Start: 2023-03-16

## 2023-03-16 RX ORDER — ATORVASTATIN CALCIUM 20 MG/1
20 TABLET, FILM COATED ORAL DAILY
Qty: 90 TABLET | Refills: 3 | Status: SHIPPED | OUTPATIENT
Start: 2023-03-16 | End: 2024-03-10

## 2023-03-16 RX ORDER — METFORMIN HYDROCHLORIDE 500 MG/1
1000 TABLET, EXTENDED RELEASE ORAL 2 TIMES DAILY
Qty: 360 TABLET | Refills: 1 | Status: SHIPPED | OUTPATIENT
Start: 2023-03-16 | End: 2023-09-12

## 2023-03-16 SDOH — ECONOMIC STABILITY: FOOD INSECURITY: WITHIN THE PAST 12 MONTHS, THE FOOD YOU BOUGHT JUST DIDN'T LAST AND YOU DIDN'T HAVE MONEY TO GET MORE.: NEVER TRUE

## 2023-03-16 SDOH — ECONOMIC STABILITY: INCOME INSECURITY: HOW HARD IS IT FOR YOU TO PAY FOR THE VERY BASICS LIKE FOOD, HOUSING, MEDICAL CARE, AND HEATING?: NOT HARD AT ALL

## 2023-03-16 SDOH — ECONOMIC STABILITY: HOUSING INSECURITY
IN THE LAST 12 MONTHS, WAS THERE A TIME WHEN YOU DID NOT HAVE A STEADY PLACE TO SLEEP OR SLEPT IN A SHELTER (INCLUDING NOW)?: NO

## 2023-03-16 SDOH — ECONOMIC STABILITY: FOOD INSECURITY: WITHIN THE PAST 12 MONTHS, YOU WORRIED THAT YOUR FOOD WOULD RUN OUT BEFORE YOU GOT MONEY TO BUY MORE.: NEVER TRUE

## 2023-03-16 ASSESSMENT — ENCOUNTER SYMPTOMS
BACK PAIN: 0
PHOTOPHOBIA: 0
CONSTIPATION: 0
BLOOD IN STOOL: 0
VOMITING: 0
COUGH: 0
DIARRHEA: 0
ABDOMINAL PAIN: 0
NAUSEA: 0
SORE THROAT: 0
SHORTNESS OF BREATH: 0

## 2023-03-16 ASSESSMENT — PATIENT HEALTH QUESTIONNAIRE - PHQ9
SUM OF ALL RESPONSES TO PHQ QUESTIONS 1-9: 0
SUM OF ALL RESPONSES TO PHQ QUESTIONS 1-9: 0
SUM OF ALL RESPONSES TO PHQ9 QUESTIONS 1 & 2: 0
SUM OF ALL RESPONSES TO PHQ QUESTIONS 1-9: 0
2. FEELING DOWN, DEPRESSED OR HOPELESS: 0
1. LITTLE INTEREST OR PLEASURE IN DOING THINGS: 0
SUM OF ALL RESPONSES TO PHQ QUESTIONS 1-9: 0

## 2023-03-16 NOTE — PROGRESS NOTES
Jeffery Robert (:  1960) is a 58 y.o. male,Established patient, here for evaluation of the following chief complaint(s):  New Patient and Establish Care         ASSESSMENT/PLAN:  1. Type 2 diabetes mellitus with other specified complication, without long-term current use of insulin (HCC)  -     metFORMIN (GLUCOPHAGE-XR) 500 MG extended release tablet; Take 2 tablets by mouth 2 times daily, Disp-360 tablet, R-1Normal  -     glimepiride (AMARYL) 4 MG tablet; Take 1 tablet by mouth 2 times daily, Disp-180 tablet, R-1Normal  -     atorvastatin (LIPITOR) 20 MG tablet; Take 1 tablet by mouth daily, Disp-90 tablet, R-3Normal  -     CBC with Auto Differential; Future  -     Comprehensive Metabolic Panel; Future  -     Hemoglobin A1C; Future  -     LIPID PANEL; Future  -     Microalbumin / Creatinine Urine Ratio; Future  2. Mixed hyperlipidemia  -     atorvastatin (LIPITOR) 20 MG tablet; Take 1 tablet by mouth daily, Disp-90 tablet, R-3Normal  3. Stroke-like symptoms  -     atorvastatin (LIPITOR) 20 MG tablet; Take 1 tablet by mouth daily, Disp-90 tablet, R-3Normal    Labs ordered today. Continue with current medication regimen. Medication will be adjusted based on results. See him back in 3 months or sooner. Return in about 3 months (around 2023). Subjective   SUBJECTIVE/OBJECTIVE:  HPI  Patient presents today to establish with new PCP. Previous PCP recently retired. Patient was taking all medication as prescribed with any side effects or adverse reactions. Review of chart shows that last A1c was completely uncontrolled. Has not been taking Ozempic secondary to cost.  Denies any other issues or concerns at this time. Review of Systems   Constitutional:  Negative for chills and fever. HENT:  Negative for congestion, hearing loss, nosebleeds and sore throat. Eyes:  Negative for photophobia. Respiratory:  Negative for cough and shortness of breath.     Cardiovascular:  Negative for chest pain, palpitations and leg swelling. Gastrointestinal:  Negative for abdominal pain, blood in stool, constipation, diarrhea, nausea and vomiting. Endocrine: Negative for polydipsia. Genitourinary:  Negative for dysuria, frequency, hematuria and urgency. Musculoskeletal:  Negative for back pain and myalgias. Skin: Negative. Neurological:  Negative for dizziness, tremors, weakness and headaches. Hematological:  Does not bruise/bleed easily. Psychiatric/Behavioral:  Negative for hallucinations and suicidal ideas. All other systems reviewed and are negative. Current Outpatient Medications:     metFORMIN (GLUCOPHAGE-XR) 500 MG extended release tablet, Take 2 tablets by mouth 2 times daily, Disp: 360 tablet, Rfl: 1    glimepiride (AMARYL) 4 MG tablet, Take 1 tablet by mouth 2 times daily, Disp: 180 tablet, Rfl: 1    atorvastatin (LIPITOR) 20 MG tablet, Take 1 tablet by mouth daily, Disp: 90 tablet, Rfl: 3    albuterol sulfate HFA (VENTOLIN HFA) 108 (90 Base) MCG/ACT inhaler, Inhale 2 puffs into the lungs 4 times daily as needed for Wheezing, Disp: 18 g, Rfl: 0    aspirin EC 81 MG EC tablet, Take 1 tablet by mouth daily (Patient taking differently: Take 81 mg by mouth three times a week), Disp: 90 tablet, Rfl: 1    Cyanocobalamin (VITAMIN B 12 PO), Take 1 tablet by mouth daily, Disp: , Rfl:     Ascorbic Acid (VITAMIN C PO), Take 1 tablet by mouth daily, Disp: , Rfl:     MAGNESIUM PO, Take 1 capsule by mouth daily, Disp: , Rfl:    Patient Active Problem List   Diagnosis    Diabetes mellitus (HCC)    Mixed hyperlipidemia    Erectile dysfunction    Trigger finger, unspecified finger    Bilateral rotator cuff dysfunction    Stroke-like symptoms    Dizziness     Past Medical History:   Diagnosis Date    Diabetes mellitus type 2 in obese (HCC)     Diverticulitis     Hyperlipidemia      No past surgical history on file.   Social History     Socioeconomic History    Marital status:      Spouse name: Not on file    Number of children: Not on file    Years of education: Not on file    Highest education level: Not on file   Occupational History    Not on file   Tobacco Use    Smoking status: Every Day     Packs/day: 0.25     Years: 43.00     Pack years: 10.75     Types: Cigarettes     Start date: 0    Smokeless tobacco: Never   Vaping Use    Vaping Use: Never used   Substance and Sexual Activity    Alcohol use: Not Currently    Drug use: Never    Sexual activity: Not on file   Other Topics Concern    Not on file   Social History Narrative    Not on file     Social Determinants of Health     Financial Resource Strain: Low Risk     Difficulty of Paying Living Expenses: Not hard at all   Food Insecurity: No Food Insecurity    Worried About Running Out of Food in the Last Year: Never true    [address] in the Last Year: Never true   Transportation Needs: Unknown    Lack of Transportation (Medical): Not on file    Lack of Transportation (Non-Medical): No   Physical Activity: Sufficiently Active    Days of Exercise per Week: 3 days    Minutes of Exercise per Session: 150+ min   Stress: Not on file   Social Connections: Not on file   Intimate Partner Violence: Not At Risk    Fear of Current or Ex-Partner: No    Emotionally Abused: No    Physically Abused: No    Sexually Abused: No   Housing Stability: Unknown    Unable to Pay for Housing in the Last Year: Not on file    Number of Places Lived in the Last Year: Not on file    Unstable Housing in the Last Year: No     Family History   Problem Relation Age of Onset    Stroke Mother     Diabetes Mother     Heart Attack Mother 67    Cancer Father         leukemia    Cancer Sister         leukemia      There are no preventive care reminders to display for this patient. There are no preventive care reminders to display for this patient.    Diabetes Management   Topic Date Due    Diabetic retinal exam  Never done    Diabetic foot exam  01/08/2021    A1C test (Diabetic or Prediabetic)  03/06/2023      Health Maintenance Due   Topic    Shingles vaccine (1 of 2)      Health Maintenance   Topic Date Due    COVID-19 Vaccine (1) Never done    Pneumococcal 0-64 years Vaccine (1 - PCV) Never done    Diabetic retinal exam  Never done    Shingles vaccine (1 of 2) Never done    Diabetic foot exam  01/08/2021    Flu vaccine (1) 08/01/2022    A1C test (Diabetic or Prediabetic)  03/06/2023    Diabetic Alb to Cr ratio (uACR) test  06/30/2023    Lipids  12/06/2023    GFR test (Diabetes, CKD 3-4, OR last GFR 15-59)  12/06/2023    Depression Screen  03/16/2024    Colorectal Cancer Screen  09/17/2024    DTaP/Tdap/Td vaccine (2 - Td or Tdap) 11/11/2030    Hepatitis A vaccine  Aged Out    Hib vaccine  Aged Out    Meningococcal (ACWY) vaccine  Aged Out    Hepatitis C screen  Discontinued    HIV screen  Discontinued      There are no preventive care reminders to display for this patient. There are no preventive care reminders to display for this patient. /70   Pulse 77   Temp 98.7 °F (37.1 °C)   Ht 5' 10\" (1.778 m)   Wt 219 lb (99.3 kg)   SpO2 97%   BMI 31.42 kg/m²     Objective   Physical Exam  Vitals reviewed. HENT:      Head: Normocephalic and atraumatic. Eyes:      General: No scleral icterus. Extraocular Movements: Extraocular movements intact. Conjunctiva/sclera: Conjunctivae normal.      Pupils: Pupils are equal, round, and reactive to light. Neck:      Thyroid: No thyromegaly. Cardiovascular:      Rate and Rhythm: Normal rate and regular rhythm. Heart sounds: Normal heart sounds. No murmur heard. Pulmonary:      Effort: Pulmonary effort is normal.      Breath sounds: Normal breath sounds. No rales. Abdominal:      General: Bowel sounds are normal. There is no distension. Palpations: Abdomen is soft. Tenderness: There is no abdominal tenderness. Musculoskeletal:         General: Normal range of motion. Cervical back: Neck supple. Right lower leg: No edema. Left lower leg: No edema. Lymphadenopathy:      Cervical: No cervical adenopathy. Skin:     General: Skin is warm and dry. Findings: No erythema or rash. Neurological:      Mental Status: He is alert and oriented to person, place, and time. Cranial Nerves: No cranial nerve deficit. Psychiatric:         Judgment: Judgment normal.                An electronic signature was used to authenticate this note.     --Edu Hernandez, DO

## 2023-07-14 ENCOUNTER — TELEPHONE (OUTPATIENT)
Dept: ORTHOPEDIC SURGERY | Age: 63
End: 2023-07-14

## 2023-07-14 NOTE — TELEPHONE ENCOUNTER
Patient called. Having increase symptoms with left carpal tunnel. Would like surgery in August or September (Lt CTR, Left cubital tunnel release, Left middle finger trigger release). Last injection left carpal tunnel was given 2/15/2023    Requesting August or September. Do you want pre-op visit?

## 2023-07-25 ENCOUNTER — PREP FOR PROCEDURE (OUTPATIENT)
Dept: ORTHOPEDIC SURGERY | Age: 63
End: 2023-07-25

## 2023-07-25 PROBLEM — G56.02 CARPAL TUNNEL SYNDROME ON LEFT: Status: ACTIVE | Noted: 2023-07-25

## 2023-07-25 PROBLEM — G56.22 CUBITAL TUNNEL SYNDROME ON LEFT: Status: ACTIVE | Noted: 2023-07-25

## 2023-07-31 NOTE — TELEPHONE ENCOUNTER
Spoke with patient regarding surgery date 8/21/2023      Surgical:  Procedure: Left Carpal Tunnel Release, Left Cubital Tunnel Release, Left Middle Trigger Finger Release   ICD Code: G56.02, G56.22, M65.332  CPT Code: 09388, 60082, 10943  Anesthesia: [facility]  Date: 8/21/2023  Time: 12:30 p.m. Place: [address] [place] [address]  Surgeon: Luz Marina James D.O. Medications reviewed with the patient / holding the following medications: D/C Aspirin and All OTC Vitamins, Minerals and Supplements 7 days pre op, last doses 8/13/2023     Pre Op Instructions reviewed with the patient. Informed patient: A hospital nurse will contact him with instructions for the day of surgery. The patient expresses understanding and is in agreement with the plan. Post Op Appointment: Date: 9/6/2023 Time: 8:20 a.m.

## 2023-08-15 ENCOUNTER — TELEPHONE (OUTPATIENT)
Dept: ORTHOPEDIC SURGERY | Age: 63
End: 2023-08-15

## 2023-08-15 NOTE — TELEPHONE ENCOUNTER
Prior Authorization    Procedure:  Left Carpal Tunnel Release, Left Cubital Tunnel Release, Left middle finger trigger release. Procedure Code: 83935, [id number], 98441  Diagnosis Code:  Robby Medellin  Y03.385  Date:  8/21/2023  Facility:  [facility]  Status: OPT   Physician:  Trisha Costello D.O. Auth Number: Pending #  [id number]  Valid:  TBA  Contact: Chantel Damico  [phone]     [address] [place] [address] will request clinical if required.

## 2023-08-16 NOTE — PROGRESS NOTES
[address] PRE-ADMISSION TESTING INSTRUCTIONS    The Preadmission Testing patient is instructed accordingly using the following criteria (check applicable):    ARRIVAL INSTRUCTIONS:  [x] Parking the day of Surgery is located in the Main Entrance lot. Upon entering the door, make an immediate right to the surgery reception desk    [x] Bring photo ID and insurance card    [] Bring in a copy of Living will or Durable Power of  papers. [x] Please be sure to arrange for responsible adult to provide transportation to and from the hospital    [x] Please arrange for responsible adult to be with you for the 24 hour period post procedure due to having anesthesia    [x] If you awake am of surgery not feeling well or have temperature >100 please call [phone]    GENERAL INSTRUCTIONS:    [x] Nothing by mouth after midnight, including gum, candy, mints or water    [x] You may brush your teeth, but do not swallow any water    [] Take medications as instructed with 1-2 oz of water    [x] Stop herbal supplements and vitamins 5 days prior to procedure    [x] Follow preop dosing of blood thinners per physician instructions    [] Take 1/2 dose of evening insulin, but no insulin after midnight    [x] No oral diabetic medications after midnight    [x] If diabetic and have low blood sugar or feel symptomatic, take 1-2oz apple juice only    [] Bring inhalers day of surgery    [] Bring C-PAP/ Bi-Pap day of surgery    [] Bring urine specimen day of surgery    [x] Shower or bath with soap, lather and rinse well, AM of Surgery, no lotion, powders or creams to surgical site    [] Follow bowel prep as instructed per surgeon    [x] No tobacco products within 24 hours of surgery     [x] No alcohol or illegal drug use within 24 hours of surgery.     [x] Jewelry, body piercing's, eyeglasses, contact lenses and dentures are not permitted into surgery (bring cases)      [x] Please do not wear any nail polish,

## 2023-08-20 ENCOUNTER — ANESTHESIA EVENT (OUTPATIENT)
Dept: OPERATING ROOM | Age: 63
End: 2023-08-20
Payer: COMMERCIAL

## 2023-08-21 ENCOUNTER — HOSPITAL ENCOUNTER (OUTPATIENT)
Age: 63
Setting detail: OUTPATIENT SURGERY
Discharge: HOME | End: 2023-08-21
Attending: STUDENT IN AN ORGANIZED HEALTH CARE EDUCATION/TRAINING PROGRAM | Admitting: STUDENT IN AN ORGANIZED HEALTH CARE EDUCATION/TRAINING PROGRAM
Payer: COMMERCIAL

## 2023-08-21 ENCOUNTER — ANESTHESIA (OUTPATIENT)
Dept: OPERATING ROOM | Age: 63
End: 2023-08-21
Payer: COMMERCIAL

## 2023-08-21 VITALS
TEMPERATURE: 97.7 F | WEIGHT: 205 LBS | RESPIRATION RATE: 18 BRPM | DIASTOLIC BLOOD PRESSURE: 70 MMHG | BODY MASS INDEX: 27.77 KG/M2 | HEART RATE: 53 BPM | SYSTOLIC BLOOD PRESSURE: 106 MMHG | HEIGHT: 72 IN | OXYGEN SATURATION: 93 %

## 2023-08-21 DIAGNOSIS — G56.02 CARPAL TUNNEL SYNDROME ON LEFT: ICD-10-CM

## 2023-08-21 DIAGNOSIS — M65.332 TRIGGER MIDDLE FINGER OF LEFT HAND: Primary | ICD-10-CM

## 2023-08-21 DIAGNOSIS — G56.22 CUBITAL TUNNEL SYNDROME ON LEFT: ICD-10-CM

## 2023-08-21 LAB
ANION GAP SERPL CALCULATED.3IONS-SCNC: 11 MMOL/L (ref 7–16)
BUN SERPL-MCNC: 12 MG/DL (ref 6–23)
CALCIUM SERPL-MCNC: 9.4 MG/DL (ref 8.6–10.2)
CHLORIDE SERPL-SCNC: 100 MMOL/L (ref 98–107)
CO2 SERPL-SCNC: 23 MMOL/L (ref 22–29)
CREAT SERPL-MCNC: 0.5 MG/DL (ref 0.7–1.2)
EKG ATRIAL RATE: 63 BPM
EKG P AXIS: 5 DEGREES
EKG P-R INTERVAL: 150 MS
EKG Q-T INTERVAL: 420 MS
EKG QRS DURATION: 84 MS
EKG QTC CALCULATION (BAZETT): 429 MS
EKG R AXIS: 1 DEGREES
EKG T AXIS: 57 DEGREES
EKG VENTRICULAR RATE: 63 BPM
ERYTHROCYTE [DISTWIDTH] IN BLOOD BY AUTOMATED COUNT: 12.1 % (ref 11.5–15)
GFR SERPL CREATININE-BSD FRML MDRD: >60 ML/MIN/1.73M2
GLUCOSE SERPL-MCNC: 348 MG/DL (ref 74–99)
HCT VFR BLD AUTO: 46.2 % (ref 37–54)
HGB BLD-MCNC: 15.9 G/DL (ref 12.5–16.5)
MCH RBC QN AUTO: 31.4 PG (ref 26–35)
MCHC RBC AUTO-ENTMCNC: 34.4 G/DL (ref 32–34.5)
MCV RBC AUTO: 91.1 FL (ref 80–99.9)
PLATELET # BLD AUTO: 210 K/UL (ref 130–450)
PMV BLD AUTO: 9.9 FL (ref 7–12)
POTASSIUM SERPL-SCNC: 4.4 MMOL/L (ref 3.5–5)
RBC # BLD AUTO: 5.07 M/UL (ref 3.8–5.8)
SODIUM SERPL-SCNC: 134 MMOL/L (ref 132–146)
WBC OTHER # BLD: 7.4 K/UL (ref 4.5–11.5)

## 2023-08-21 PROCEDURE — 80048 BASIC METABOLIC PNL TOTAL CA: CPT

## 2023-08-21 PROCEDURE — 6360000002 HC RX W HCPCS: Performed by: STUDENT IN AN ORGANIZED HEALTH CARE EDUCATION/TRAINING PROGRAM

## 2023-08-21 PROCEDURE — 2500000003 HC RX 250 WO HCPCS

## 2023-08-21 PROCEDURE — 7100000010 HC PHASE II RECOVERY - FIRST 15 MIN: Performed by: STUDENT IN AN ORGANIZED HEALTH CARE EDUCATION/TRAINING PROGRAM

## 2023-08-21 PROCEDURE — 93005 ELECTROCARDIOGRAM TRACING: CPT

## 2023-08-21 PROCEDURE — 7100000011 HC PHASE II RECOVERY - ADDTL 15 MIN: Performed by: STUDENT IN AN ORGANIZED HEALTH CARE EDUCATION/TRAINING PROGRAM

## 2023-08-21 PROCEDURE — 2709999900 HC NON-CHARGEABLE SUPPLY: Performed by: STUDENT IN AN ORGANIZED HEALTH CARE EDUCATION/TRAINING PROGRAM

## 2023-08-21 PROCEDURE — 3600000002 HC SURGERY LEVEL 2 BASE: Performed by: STUDENT IN AN ORGANIZED HEALTH CARE EDUCATION/TRAINING PROGRAM

## 2023-08-21 PROCEDURE — 85027 COMPLETE CBC AUTOMATED: CPT

## 2023-08-21 PROCEDURE — 3700000000 HC ANESTHESIA ATTENDED CARE: Performed by: STUDENT IN AN ORGANIZED HEALTH CARE EDUCATION/TRAINING PROGRAM

## 2023-08-21 PROCEDURE — 7100000000 HC PACU RECOVERY - FIRST 15 MIN: Performed by: STUDENT IN AN ORGANIZED HEALTH CARE EDUCATION/TRAINING PROGRAM

## 2023-08-21 PROCEDURE — 3600000012 HC SURGERY LEVEL 2 ADDTL 15MIN: Performed by: STUDENT IN AN ORGANIZED HEALTH CARE EDUCATION/TRAINING PROGRAM

## 2023-08-21 PROCEDURE — 6360000002 HC RX W HCPCS

## 2023-08-21 PROCEDURE — 7100000001 HC PACU RECOVERY - ADDTL 15 MIN: Performed by: STUDENT IN AN ORGANIZED HEALTH CARE EDUCATION/TRAINING PROGRAM

## 2023-08-21 PROCEDURE — 6370000000 HC RX 637 (ALT 250 FOR IP): Performed by: ANESTHESIOLOGY

## 2023-08-21 PROCEDURE — 3700000001 HC ADD 15 MINUTES (ANESTHESIA): Performed by: STUDENT IN AN ORGANIZED HEALTH CARE EDUCATION/TRAINING PROGRAM

## 2023-08-21 PROCEDURE — 2580000003 HC RX 258: Performed by: STUDENT IN AN ORGANIZED HEALTH CARE EDUCATION/TRAINING PROGRAM

## 2023-08-21 RX ORDER — HYDROCODONE BITARTRATE AND ACETAMINOPHEN 5; 325 MG/1; MG/1
1 TABLET ORAL EVERY 4 HOURS PRN
Qty: 18 TABLET | Refills: 0 | Status: SHIPPED | OUTPATIENT
Start: 2023-08-21 | End: 2023-08-24

## 2023-08-21 RX ORDER — MEPERIDINE HYDROCHLORIDE 25 MG/ML
12.5 INJECTION INTRAMUSCULAR; INTRAVENOUS; SUBCUTANEOUS EVERY 5 MIN PRN
Status: DISCONTINUED | OUTPATIENT
Start: 2023-08-21 | End: 2023-08-21 | Stop reason: HOSPADM

## 2023-08-21 RX ORDER — LIDOCAINE HYDROCHLORIDE 20 MG/ML
INJECTION, SOLUTION EPIDURAL; INFILTRATION; INTRACAUDAL; PERINEURAL PRN
Status: DISCONTINUED | OUTPATIENT
Start: 2023-08-21 | End: 2023-08-21 | Stop reason: SDUPTHER

## 2023-08-21 RX ORDER — SODIUM CHLORIDE 0.9 % (FLUSH) 0.9 %
5-40 SYRINGE (ML) INJECTION EVERY 12 HOURS SCHEDULED
Status: DISCONTINUED | OUTPATIENT
Start: 2023-08-21 | End: 2023-08-21 | Stop reason: HOSPADM

## 2023-08-21 RX ORDER — BUPIVACAINE HYDROCHLORIDE 2.5 MG/ML
INJECTION, SOLUTION EPIDURAL; INFILTRATION; INTRACAUDAL PRN
Status: DISCONTINUED | OUTPATIENT
Start: 2023-08-21 | End: 2023-08-21 | Stop reason: ALTCHOICE

## 2023-08-21 RX ORDER — OXYCODONE HYDROCHLORIDE 5 MG/1
5 TABLET ORAL PRN
Status: COMPLETED | OUTPATIENT
Start: 2023-08-21 | End: 2023-08-21

## 2023-08-21 RX ORDER — SODIUM CHLORIDE 0.9 % (FLUSH) 0.9 %
5-40 SYRINGE (ML) INJECTION PRN
Status: DISCONTINUED | OUTPATIENT
Start: 2023-08-21 | End: 2023-08-21 | Stop reason: HOSPADM

## 2023-08-21 RX ORDER — MIDAZOLAM HYDROCHLORIDE 1 MG/ML
INJECTION INTRAMUSCULAR; INTRAVENOUS PRN
Status: DISCONTINUED | OUTPATIENT
Start: 2023-08-21 | End: 2023-08-21 | Stop reason: SDUPTHER

## 2023-08-21 RX ORDER — PHENYLEPHRINE HCL IN 0.9% NACL 1 MG/10 ML
SYRINGE (ML) INTRAVENOUS PRN
Status: DISCONTINUED | OUTPATIENT
Start: 2023-08-21 | End: 2023-08-21 | Stop reason: SDUPTHER

## 2023-08-21 RX ORDER — ONDANSETRON 2 MG/ML
INJECTION INTRAMUSCULAR; INTRAVENOUS PRN
Status: DISCONTINUED | OUTPATIENT
Start: 2023-08-21 | End: 2023-08-21 | Stop reason: SDUPTHER

## 2023-08-21 RX ORDER — OXYCODONE HYDROCHLORIDE 5 MG/1
10 TABLET ORAL PRN
Status: COMPLETED | OUTPATIENT
Start: 2023-08-21 | End: 2023-08-21

## 2023-08-21 RX ORDER — PROPOFOL 10 MG/ML
INJECTION, EMULSION INTRAVENOUS PRN
Status: DISCONTINUED | OUTPATIENT
Start: 2023-08-21 | End: 2023-08-21 | Stop reason: SDUPTHER

## 2023-08-21 RX ORDER — SODIUM CHLORIDE 9 MG/ML
INJECTION, SOLUTION INTRAVENOUS CONTINUOUS
Status: DISCONTINUED | OUTPATIENT
Start: 2023-08-21 | End: 2023-08-21 | Stop reason: HOSPADM

## 2023-08-21 RX ORDER — DEXAMETHASONE SODIUM PHOSPHATE 4 MG/ML
INJECTION, SOLUTION INTRA-ARTICULAR; INTRALESIONAL; INTRAMUSCULAR; INTRAVENOUS; SOFT TISSUE PRN
Status: DISCONTINUED | OUTPATIENT
Start: 2023-08-21 | End: 2023-08-21 | Stop reason: SDUPTHER

## 2023-08-21 RX ORDER — PROCHLORPERAZINE EDISYLATE 5 MG/ML
5 INJECTION INTRAMUSCULAR; INTRAVENOUS
Status: DISCONTINUED | OUTPATIENT
Start: 2023-08-21 | End: 2023-08-21 | Stop reason: HOSPADM

## 2023-08-21 RX ORDER — SODIUM CHLORIDE 9 MG/ML
INJECTION, SOLUTION INTRAVENOUS PRN
Status: DISCONTINUED | OUTPATIENT
Start: 2023-08-21 | End: 2023-08-21 | Stop reason: HOSPADM

## 2023-08-21 RX ORDER — FENTANYL CITRATE 50 UG/ML
INJECTION, SOLUTION INTRAMUSCULAR; INTRAVENOUS PRN
Status: DISCONTINUED | OUTPATIENT
Start: 2023-08-21 | End: 2023-08-21 | Stop reason: SDUPTHER

## 2023-08-21 RX ADMIN — FENTANYL CITRATE 50 MCG: 50 INJECTION, SOLUTION INTRAMUSCULAR; INTRAVENOUS at 12:17

## 2023-08-21 RX ADMIN — Medication 50 MCG: at 12:58

## 2023-08-21 RX ADMIN — WATER 2000 MG: 1 INJECTION INTRAMUSCULAR; INTRAVENOUS; SUBCUTANEOUS at 12:21

## 2023-08-21 RX ADMIN — PROPOFOL 200 MG: 10 INJECTION, EMULSION INTRAVENOUS at 12:17

## 2023-08-21 RX ADMIN — LIDOCAINE HYDROCHLORIDE 100 MG: 20 INJECTION, SOLUTION EPIDURAL; INFILTRATION; INTRACAUDAL; PERINEURAL at 12:17

## 2023-08-21 RX ADMIN — ONDANSETRON 4 MG: 2 INJECTION INTRAMUSCULAR; INTRAVENOUS at 12:22

## 2023-08-21 RX ADMIN — OXYCODONE HYDROCHLORIDE 5 MG: 5 TABLET ORAL at 13:51

## 2023-08-21 RX ADMIN — SODIUM CHLORIDE: 9 INJECTION, SOLUTION INTRAVENOUS at 12:08

## 2023-08-21 RX ADMIN — SODIUM CHLORIDE: 9 INJECTION, SOLUTION INTRAVENOUS at 12:51

## 2023-08-21 RX ADMIN — FENTANYL CITRATE 50 MCG: 50 INJECTION, SOLUTION INTRAMUSCULAR; INTRAVENOUS at 12:34

## 2023-08-21 RX ADMIN — Medication 100 MCG: at 13:08

## 2023-08-21 RX ADMIN — Medication 50 MCG: at 12:55

## 2023-08-21 RX ADMIN — MIDAZOLAM 2 MG: 1 INJECTION INTRAMUSCULAR; INTRAVENOUS at 12:08

## 2023-08-21 RX ADMIN — Medication 50 MCG: at 12:54

## 2023-08-21 RX ADMIN — DEXAMETHASONE SODIUM PHOSPHATE 8 MG: 4 INJECTION, SOLUTION INTRAMUSCULAR; INTRAVENOUS at 12:22

## 2023-08-21 RX ADMIN — Medication 50 MCG: at 12:57

## 2023-08-21 RX ADMIN — Medication 50 MCG: at 13:01

## 2023-08-21 ASSESSMENT — PAIN SCALES - GENERAL
PAINLEVEL_OUTOF10: 3
PAINLEVEL_OUTOF10: 5
PAINLEVEL_OUTOF10: 5

## 2023-08-21 ASSESSMENT — PAIN DESCRIPTION - PAIN TYPE
TYPE: SURGICAL PAIN

## 2023-08-21 ASSESSMENT — PAIN DESCRIPTION - ORIENTATION
ORIENTATION: LEFT

## 2023-08-21 ASSESSMENT — PAIN DESCRIPTION - LOCATION
LOCATION: ARM
LOCATION: ARM
LOCATION: WRIST

## 2023-08-21 ASSESSMENT — LIFESTYLE VARIABLES: SMOKING_STATUS: 1

## 2023-08-21 ASSESSMENT — PAIN DESCRIPTION - DESCRIPTORS
DESCRIPTORS: ACHING;DISCOMFORT
DESCRIPTORS: DISCOMFORT
DESCRIPTORS: ACHING;DISCOMFORT

## 2023-08-21 ASSESSMENT — PAIN - FUNCTIONAL ASSESSMENT: PAIN_FUNCTIONAL_ASSESSMENT: NONE - DENIES PAIN

## 2023-08-21 NOTE — ANESTHESIA POSTPROCEDURE EVALUATION
Department of Anesthesiology  Postprocedure Note    Patient: Naveen Burrell  MRN: [id number]  YOB: 1960  Date of evaluation: 8/21/2023      Procedure Summary     Date: 08/21/23 Room / Location: [facility] OR 04 / [facility]    Anesthesia Start: 1208 Anesthesia Stop: 1322    Procedure: LEFT CARPAL TUNNEL RELEASE, LEFT CUBITAL TUNNEL RELEASE, LEFT MIDDLE TRIGGER FINGER RELEASE (Left: Hand) Diagnosis:       Carpal tunnel syndrome on left      Cubital tunnel syndrome on left      Trigger middle finger of left hand      (Carpal tunnel syndrome on left [G56.02])      (Cubital tunnel syndrome on left [G56.22])      (Trigger middle finger of left hand [O91.083])    Surgeons: Ara Velasco DO Responsible Provider: Tarsha Nicole DO    Anesthesia Type: general ASA Status: 2          Anesthesia Type: No value filed.     Britney Phase I: Britney Score: 10    Britney Phase II:        Anesthesia Post Evaluation    Patient location during evaluation: PACU  Patient participation: complete - patient participated  Level of consciousness: awake and alert  Pain score: 0  Airway patency: patent  Nausea & Vomiting: no nausea and no vomiting  Complications: no  Cardiovascular status: blood pressure returned to baseline  Respiratory status: acceptable  Hydration status: euvolemic

## 2023-08-21 NOTE — BRIEF OP NOTE
Brief Postoperative Note      Patient: Dariel Roa  YOB: 1960  MRN: [id number]    Date of Procedure: 8/21/2023    Pre-Op Diagnosis Codes:     * Carpal tunnel syndrome on left [G56.02]     * Cubital tunnel syndrome on left [G56.22]     * Trigger middle finger of left hand [M65.332]    Post-Op Diagnosis: Same       Procedure(s):  LEFT CARPAL TUNNEL RELEASE, LEFT CUBITAL TUNNEL RELEASE, LEFT MIDDLE TRIGGER FINGER RELEASE    Surgeon(s):  Candelaria Nieto DO    Assistant:  Resident: Jose Arredondo DO    Anesthesia: General    Estimated Blood Loss (mL): Minimal    Complications: None    Specimens:   * No specimens in log *    Implants:  * No implants in log *      Drains: * No LDAs found *    Findings: Left carpal tunnel release, left cubital tunnel release, left middle finger trigger finger release      Electronically signed by Jennifer Snell DO on 8/21/2023 at 1:09 PM

## 2023-08-21 NOTE — OP NOTE
prepped and draped in a standard sterile orthopedic fashion. Appropriate timeout was performed confirming side and site of surgery. Esmarch was used to exsanguinate the arm and the tourniquet was inflated 250 mmHg. Local anesthetic was injected in the A1 pulley of his left middle finger, along the line of our incision for his carpal tunnel and along the line of our incision for his cubital tunnel. Attention was first turned to the trigger finger. A 15 blade scalpel was used to make a longitudinal incision in line with the flexor tendon centered over the A1 pulley of the metacarpal head. Blunt dissection was carried down to the flexor tendon and retractors were placed with care to protect the radial and ulnar neurovascular bundle on each side. A knife was used to sharply incise the A1 pulley proximally and distally. Scissor was used to complete our release. The tendons were pulled out of the flexor sheath and examined. They are found to be free of masses or lesions. The finger was taken through range of motion passively without triggering or catching. Attention was then turned to the carpal tunnel. An incision was created in his palm with the distal extent center at [address] cardinal line and in line with the fourth ray. Dissection was carried down to skin subcutaneous tissue. Palmar fascia was identified and incised line with our incision. Deep retractors were placed and transverse fibers of the carpal ligament were identified. Knife was used to incise the transverse carpal ligament on the ulnar side of the carpal tunnel. Carpal tunnel was entered and dissection was carried distally until we achieve complete release at the fat pad in his palm. Care was taken protect the neurovascular bundles. Under direct visualization the remainder of his transverse carpal  ligament was incised with a 15 blade scalpel.   Metzenbaum dissection scissors were used to complete the release proximal with care to

## 2023-09-06 ENCOUNTER — OFFICE VISIT (OUTPATIENT)
Dept: ORTHOPEDIC SURGERY | Age: 63
End: 2023-09-06

## 2023-09-06 DIAGNOSIS — G56.02 CARPAL TUNNEL SYNDROME ON LEFT: Primary | ICD-10-CM

## 2023-09-06 DIAGNOSIS — M65.332 TRIGGER MIDDLE FINGER OF LEFT HAND: ICD-10-CM

## 2023-09-06 DIAGNOSIS — G56.22 CUBITAL TUNNEL SYNDROME ON LEFT: ICD-10-CM

## 2023-09-06 PROCEDURE — 99024 POSTOP FOLLOW-UP VISIT: CPT | Performed by: STUDENT IN AN ORGANIZED HEALTH CARE EDUCATION/TRAINING PROGRAM

## 2023-09-06 NOTE — PROGRESS NOTES
Follow Up Post Operative Visit     Surgery: Left carpal tunnel release, left cubital tunnel release, left middle finger trigger release  Date: 8/21/2023    Subjective:    Rosemary Cotter is here for follow up visit s/p above procedure. He is doing very well. All of his nighttime symptoms have resolved and he is very happy with his results. He still has some numbness at the tip of his index and middle finger. His incisions of healed appropriately without signs of infection. Controlled Substances Monitoring:        Physical Exam:    No data recorded    General: Alert and oriented x3, no acute distress  Cardiovascular/pulmonary: No labored breathing, peripheral perfusion intact  Musculoskeletal:    Left upper extremity: All 3 incisions of healed appropriately with good scar formation. Sutures removed. No signs of infection. Has full elbow range of motion with 5 out of 5 strength. He can flex and extend his wrist pronate supinate radial ulnar deviation with full range of motion without pain. He can flex and stent all of his fingers without any triggering or pain. Subjectively he has decree sensation at the tip of his middle and index finger however sensation is grossly intact to light touch to radial ulnar median nerve distribution. Imaging: No new imaging    Assessment and Plan: 2 weeks status post left carpal tunnel release, left cubital tunnel release, left  middle finger trigger release  -His nighttime numbness and tingling have resolved and he is sleeping through the night. He is very happy with his results. We talked about how the numbness in his fingertips can take up to a year to go away and may not ever fully recover given the degree of compression. All of his triggering symptoms have resolved. I would give his hand another week or 2 before he returns to golf. We talked about scar massage and desensitization.   Otherwise I will follow him up in about 6 weeks to see how he is doing and at

## 2023-10-18 ENCOUNTER — OFFICE VISIT (OUTPATIENT)
Dept: ORTHOPEDIC SURGERY | Age: 63
End: 2023-10-18

## 2023-10-18 DIAGNOSIS — G56.22 CUBITAL TUNNEL SYNDROME ON LEFT: ICD-10-CM

## 2023-10-18 DIAGNOSIS — G56.02 CARPAL TUNNEL SYNDROME ON LEFT: Primary | ICD-10-CM

## 2023-10-18 PROCEDURE — 99024 POSTOP FOLLOW-UP VISIT: CPT | Performed by: STUDENT IN AN ORGANIZED HEALTH CARE EDUCATION/TRAINING PROGRAM

## 2023-10-18 NOTE — PROGRESS NOTES
Follow Up Post Operative Visit     Surgery: Left carpal tunnel release, left cubital tunnel release, left middle finger trigger release  Date: 8/21/2023    Subjective:    Yun Pinto is here for follow up visit s/p above procedure. He is doing very well. He played his best round of golf of the year last week. He is very pleased with his results. His nighttime symptoms have resolved. Denies numbness and tingling except for a small spot at the tip of his index and middle finger. Controlled Substances Monitoring:        Physical Exam:    No data recorded    General: Alert and oriented x3, no acute distress  Cardiovascular/pulmonary: No labored breathing, peripheral perfusion intact  Musculoskeletal:    Left upper extremity: All 3 incisions of healed appropriately with good scar formation. He is nontender around his incisions or in his thenar and hypothenar eminence. No signs of infection. Has full elbow range of motion with 5 out of 5 strength. He can flex and extend his wrist pronate supinate radial ulnar deviation with full range of motion without pain. He can flex and stent all of his fingers without any triggering or pain. Subjectively he has decree sensation at the tip of his middle and index finger however sensation is grossly intact to light touch to radial ulnar median nerve distribution. Imaging: No new imaging    Assessment and Plan: 2 months status post left carpal tunnel release, left cubital tunnel release, left  middle finger trigger release  -He is doing very well. He is returned to playing golf. He is very happy with his result. He has not been woken up at night since his surgery. He has small amount of numbness at the tip of his index and middle finger which is starting to improve. He is going to call and let us know if he wants to do something with his right side. Otherwise he can follow-up as needed.       Trisha Costello DO   Orthopaedic Surgery   10/18/23  8:20 AM

## 2023-11-14 ENCOUNTER — HOSPITAL ENCOUNTER (OUTPATIENT)
Age: 63
Discharge: HOME | End: 2023-11-16

## 2023-11-20 LAB — SURGICAL PATHOLOGY REPORT: NORMAL

## 2024-09-20 ENCOUNTER — TELEPHONE (OUTPATIENT)
Dept: CARDIOLOGY | Age: 64
End: 2024-09-20

## 2024-09-24 ENCOUNTER — HOSPITAL ENCOUNTER (OUTPATIENT)
Dept: CARDIOLOGY | Age: 64
Discharge: HOME | End: 2024-09-26
Payer: MEDICARE

## 2024-09-24 VITALS
RESPIRATION RATE: 16 BRPM | WEIGHT: 205 LBS | SYSTOLIC BLOOD PRESSURE: 120 MMHG | DIASTOLIC BLOOD PRESSURE: 74 MMHG | HEIGHT: 72 IN | HEART RATE: 72 BPM | BODY MASS INDEX: 27.77 KG/M2

## 2024-09-24 DIAGNOSIS — R07.89 OTHER CHEST PAIN: ICD-10-CM

## 2024-09-24 LAB
ECHO BSA: 2.17 M2
NUC STRESS EJECTION FRACTION: 73 %
STRESS ANGINA INDEX: 0
STRESS BASELINE DIAS BP: 74 MMHG
STRESS BASELINE HR: 70 BPM
STRESS BASELINE ST DEPRESSION: 0 MM
STRESS BASELINE SYS BP: 120 MMHG
STRESS ESTIMATED WORKLOAD: 6.9 METS
STRESS EXERCISE DUR MIN: 6 MIN
STRESS EXERCISE DUR SEC: 0 SEC
STRESS PEAK DIAS BP: 78 MMHG
STRESS PEAK SYS BP: 170 MMHG
STRESS PERCENT HR ACHIEVED: 88 %
STRESS POST PEAK HR: 137 BPM
STRESS RATE PRESSURE PRODUCT: NORMAL BPM*MMHG
STRESS SR DUKE TREADMILL SCORE: 6
STRESS ST DEPRESSION: 0 MM
STRESS TARGET HR: 155 BPM
TID: 1.17

## 2024-09-24 PROCEDURE — 93018 CV STRESS TEST I&R ONLY: CPT | Performed by: INTERNAL MEDICINE

## 2024-09-24 PROCEDURE — 78452 HT MUSCLE IMAGE SPECT MULT: CPT | Performed by: INTERNAL MEDICINE

## 2024-09-24 PROCEDURE — 93016 CV STRESS TEST SUPVJ ONLY: CPT | Performed by: INTERNAL MEDICINE

## 2024-09-24 PROCEDURE — 2580000003 HC RX 258: Performed by: INTERNAL MEDICINE

## 2024-09-24 PROCEDURE — 3430000000 HC RX DIAGNOSTIC RADIOPHARMACEUTICAL: Performed by: INTERNAL MEDICINE

## 2024-09-24 PROCEDURE — A9500 TC99M SESTAMIBI: HCPCS | Performed by: INTERNAL MEDICINE

## 2024-09-24 PROCEDURE — 93017 CV STRESS TEST TRACING ONLY: CPT

## 2024-09-24 RX ORDER — TETRAKIS(2-METHOXYISOBUTYLISOCYANIDE)COPPER(I) TETRAFLUOROBORATE 1 MG/ML
10.5 INJECTION, POWDER, LYOPHILIZED, FOR SOLUTION INTRAVENOUS
Status: COMPLETED | OUTPATIENT
Start: 2024-09-24 | End: 2024-09-24

## 2024-09-24 RX ORDER — SODIUM CHLORIDE 0.9 % (FLUSH) 0.9 %
10 SYRINGE (ML) INJECTION PRN
Status: DISCONTINUED | OUTPATIENT
Start: 2024-09-24 | End: 2024-09-27 | Stop reason: HOSPADM

## 2024-09-24 RX ORDER — TETRAKIS(2-METHOXYISOBUTYLISOCYANIDE)COPPER(I) TETRAFLUOROBORATE 1 MG/ML
33.2 INJECTION, POWDER, LYOPHILIZED, FOR SOLUTION INTRAVENOUS
Status: DISCONTINUED | OUTPATIENT
Start: 2024-09-24 | End: 2024-09-27 | Stop reason: HOSPADM

## 2024-09-24 RX ADMIN — Medication 10.5 MILLICURIE: at 07:13

## 2024-09-24 RX ADMIN — SODIUM CHLORIDE, PRESERVATIVE FREE 10 ML: 5 INJECTION INTRAVENOUS at 07:13

## 2025-02-19 NOTE — PROGRESS NOTES
"Shant Brooke, PGY-1  2nd call: Ciera Rg, PGY-2  3rd call: Blaze Hidalgo PGY-3    On weekends and after 6PM:  At [facility]: Please reach out to the orthopaedic on-call resident ([id number])  At [facility]: Please reach out to the orthopaedic on-call SIMIN or resident (please refer to Qgenda)    " Post-Discharge Transitional Care Management Services or Hospital Follow Up      Lucy Magallanes [facility]   YOB: 1960    Date of Office Visit:  9/30/2021  Date of Hospital Admission: 9/26/21  Date of Hospital Discharge: 9/27/21  Readmission Risk Score(high >=14%.  Medium >=10%):No data recorded    Care management risk score Rising risk (score 2-5) and Complex Care (Scores >=6): 1     Non face to face  following discharge, date last encounter closed (first attempt may have been earlier): *No documented post hospital discharge outreach found in the last 14 days *No documented post hospital discharge outreach found in the last 14 days    Call initiated 2 business days of discharge: *No response recorded in the last 14 days     Patient Active Problem List   Diagnosis    Diabetes mellitus ([facility] [address]    Mixed hyperlipidemia    Erectile dysfunction    Trigger finger, unspecified finger    Bilateral rotator cuff dysfunction    Stroke-like symptoms    Dizziness       No Known Allergies    Medications listed as ordered at the time of discharge from hospital   Tutu Sinha [address] Medication Instructions WARNER:    Printed on:09/30/21 7184   Medication Information                      Ascorbic Acid (VITAMIN C PO)  Take 1 tablet by mouth daily             aspirin EC 81 MG EC tablet  Take 1 tablet by mouth daily             atorvastatin (LIPITOR) 20 MG tablet  Take 1 tablet by mouth daily             Cyanocobalamin (VITAMIN B 12 PO)  Take 1 tablet by mouth daily             glimepiride (AMARYL) 4 MG tablet  Take 4 mg by mouth 2 times daily             MAGNESIUM PO  Take 1 capsule by mouth daily             metFORMIN (GLUCOPHAGE-XR) 500 MG extended release tablet  Take 2 tablets by mouth 2 times daily             Omega-3 Fatty Acids (FISH OIL PO)  Take 1 capsule by mouth daily             VITAMIN D PO  Take 1 tablet by mouth daily             VITAMIN E PO  Take 1 capsule by mouth daily                   Medications marked \"taking\" at this time  Outpatient Medications Marked as Taking for the 9/30/21 encounter (Office Visit) with Jerry Roche MD   Medication Sig Dispense Refill    atorvastatin (LIPITOR) 20 MG tablet Take 1 tablet by mouth daily 90 tablet 1    metFORMIN (GLUCOPHAGE-XR) 500 MG extended release tablet Take 2 tablets by mouth 2 times daily 360 tablet 1    aspirin EC 81 MG EC tablet Take 1 tablet by mouth daily 90 tablet 1    glimepiride (AMARYL) 4 MG tablet Take 4 mg by mouth 2 times daily      VITAMIN E PO Take 1 capsule by mouth daily      Cyanocobalamin (VITAMIN B 12 PO) Take 1 tablet by mouth daily      Ascorbic Acid (VITAMIN C PO) Take 1 tablet by mouth daily      VITAMIN D PO Take 1 tablet by mouth daily      Omega-3 Fatty Acids (FISH OIL PO) Take 1 capsule by mouth daily      MAGNESIUM PO Take 1 capsule by mouth daily          Medications patient taking as of now reconciled against medications ordered at time of hospital discharge: Yes    Chief Complaint   Patient presents with    Follow-Up from Hospital       HPI    Inpatient course: Discharge summary/ER notes reviewed- see chart. Patient was seen in the emergency department for issues with blurry vision, dizziness, ataxia, and sudden headache that started while playing golf. Patient had a headache on the back of his neck which was rated 6 out of 10. Patient took an Aleve but this did not help. Patient also ate a candy bar and had a red bull. No LOC, falls, change in bowel/urination. HS troponin was 12. CBC and BMP were essentially nonsignificant. CTA HEAD W CONTRAST   Final Result   1. Unenhanced CT shows no acute intracranial hemorrhage or edema.       2. No intracranial arterial stenosis.       3. No stenosis involving proximal or distal cervical internal carotid   arteries or vertebral arteries.       4. No evidence of stroke on CT brain perfusion.  If clinical concern persists   for acute stroke, MRI brain with diffusion-weighted imaging could be helpful   for further evaluation.           CT BRAIN PERFUSION   Final Result   1. Unenhanced CT shows no acute intracranial hemorrhage or edema.       2. No intracranial arterial stenosis.       3. No stenosis involving proximal or distal cervical internal carotid   arteries or vertebral arteries.       4. No evidence of stroke on CT brain perfusion. If clinical concern persists   for acute stroke, MRI brain with diffusion-weighted imaging could be helpful   for further evaluation.           CTA NECK W CONTRAST   Final Result   1. Unenhanced CT shows no acute intracranial hemorrhage or edema.       2. No intracranial arterial stenosis.       3. No stenosis involving proximal or distal cervical internal carotid   arteries or vertebral arteries.       4. No evidence of stroke on CT brain perfusion. If clinical concern persists   for acute stroke, MRI brain with diffusion-weighted imaging could be helpful   for further evaluation.           CT HEAD WO CONTRAST   Final Result   1. Unenhanced CT shows no acute intracranial hemorrhage or edema.       2. No intracranial arterial stenosis.       3. No stenosis involving proximal or distal cervical internal carotid   arteries or vertebral arteries.       4. No evidence of stroke on CT brain perfusion. If clinical concern persists   for acute stroke, MRI brain with diffusion-weighted imaging could be helpful   for further evaluation. MRI BRAIN     Impression   No acute infarct or hemorrhage.             Originally based off of this patient was to be admitted under neurology. Patient did have a virtual consultation with neurology. Due to delay in transfer patient left the hospital without being admitted and following with neurology. Patient was placed on aspirin 325 mg daily and atorvastatin 80 mg daily. Patient reports that he started feeling better about 1 day later.      Interval history/Current status: Patient reports that since leaving the hospital in general he has been doing well. Patient has no concerns or complaints today. Patient is convinced that his symptoms were secondary to low blood sugar. Patient does report taking his diabetes medications in the morning and not consuming his morning meal.  Patient did not check his blood glucose. Patient reports that he has been taking his atorvastatin but only at 20 mg daily and aspirin 81 mg daily. Patient had his work-up as noted above. No additional work-up has been completed. Review of Systems   Constitutional: Negative for chills and fever. HENT: Negative for congestion, rhinorrhea and sore throat. Respiratory: Negative for shortness of breath and wheezing. Cardiovascular: Negative for chest pain and leg swelling. Gastrointestinal: Negative for abdominal pain, constipation, diarrhea, nausea and vomiting. Skin: Negative for rash. Neurological: Negative for light-headedness and headaches. Vitals:    09/30/21 1549   BP: 122/70   Pulse: 75   Resp: 16   Temp: 97.8 °F (36.6 °C)   SpO2: 97%   Weight: 225 lb 6.4 oz (102.2 kg)   Height: 5' 10\" (1.778 m)     Body mass index is 32.34 kg/m². Wt Readings from Last 3 Encounters:   09/30/21 225 lb 6.4 oz (102.2 kg)   09/27/21 222 lb (100.7 kg)   07/08/21 222 lb (100.7 kg)     BP Readings from Last 3 Encounters:   09/30/21 122/70   09/27/21 (!) 157/77   06/28/21 122/72       Physical Exam  Constitutional:       Appearance: He is well-developed. HENT:      Head: Normocephalic. Eyes:      Extraocular Movements: Extraocular movements intact. Conjunctiva/sclera: Conjunctivae normal.   Neck:      Vascular: No carotid bruit. Cardiovascular:      Rate and Rhythm: Normal rate and regular rhythm. Heart sounds: Normal heart sounds. No murmur heard. Pulmonary:      Effort: Pulmonary effort is normal.      Breath sounds: Normal breath sounds. No wheezing or rales.    Abdominal:      General: Bowel sounds are normal.

## 2025-03-20 ENCOUNTER — APPOINTMENT (OUTPATIENT)
Dept: CT IMAGING | Age: 65
End: 2025-03-20
Payer: MEDICARE

## 2025-03-20 ENCOUNTER — HOSPITAL ENCOUNTER (EMERGENCY)
Age: 65
Discharge: HOME | End: 2025-03-20
Attending: EMERGENCY MEDICINE
Payer: MEDICARE

## 2025-03-20 VITALS
WEIGHT: 205 LBS | HEIGHT: 70 IN | HEART RATE: 65 BPM | DIASTOLIC BLOOD PRESSURE: 74 MMHG | OXYGEN SATURATION: 99 % | SYSTOLIC BLOOD PRESSURE: 117 MMHG | RESPIRATION RATE: 16 BRPM | TEMPERATURE: 97.1 F | BODY MASS INDEX: 29.35 KG/M2

## 2025-03-20 DIAGNOSIS — R10.13 ABDOMINAL PAIN, EPIGASTRIC: Primary | ICD-10-CM

## 2025-03-20 LAB
ALBUMIN SERPL-MCNC: 4.5 G/DL (ref 3.5–5.2)
ALP SERPL-CCNC: 80 U/L (ref 40–129)
ALT SERPL-CCNC: 22 U/L (ref 0–40)
ANION GAP SERPL CALCULATED.3IONS-SCNC: 13 MMOL/L (ref 7–16)
AST SERPL-CCNC: 18 U/L (ref 0–39)
BASOPHILS # BLD: 0.05 K/UL (ref 0–0.2)
BASOPHILS NFR BLD: 1 % (ref 0–2)
BILIRUB SERPL-MCNC: 0.4 MG/DL (ref 0–1.2)
BUN SERPL-MCNC: 14 MG/DL (ref 6–23)
CALCIUM SERPL-MCNC: 9.3 MG/DL (ref 8.6–10.2)
CHLORIDE SERPL-SCNC: 95 MMOL/L (ref 98–107)
CO2 SERPL-SCNC: 25 MMOL/L (ref 22–29)
CREAT SERPL-MCNC: 0.7 MG/DL (ref 0.7–1.2)
EOSINOPHIL # BLD: 0.86 K/UL (ref 0.05–0.5)
EOSINOPHILS RELATIVE PERCENT: 11 % (ref 0–6)
ERYTHROCYTE [DISTWIDTH] IN BLOOD BY AUTOMATED COUNT: 13.1 % (ref 11.5–15)
GFR, ESTIMATED: >90 ML/MIN/1.73M2
GLUCOSE SERPL-MCNC: 347 MG/DL (ref 74–99)
HCT VFR BLD AUTO: 46.2 % (ref 37–54)
HGB BLD-MCNC: 15.1 G/DL (ref 12.5–16.5)
IMM GRANULOCYTES # BLD AUTO: <0.03 K/UL (ref 0–0.58)
IMM GRANULOCYTES NFR BLD: 0 % (ref 0–5)
LACTATE BLDV-SCNC: 1.7 MMOL/L (ref 0.5–2.2)
LIPASE SERPL-CCNC: 41 U/L (ref 13–60)
LYMPHOCYTES NFR BLD: 2.55 K/UL (ref 1.5–4)
LYMPHOCYTES RELATIVE PERCENT: 32 % (ref 20–42)
MCH RBC QN AUTO: 31.6 PG (ref 26–35)
MCHC RBC AUTO-ENTMCNC: 32.7 G/DL (ref 32–34.5)
MCV RBC AUTO: 96.7 FL (ref 80–99.9)
MONOCYTES NFR BLD: 0.53 K/UL (ref 0.1–0.95)
MONOCYTES NFR BLD: 7 % (ref 2–12)
NEUTROPHILS NFR BLD: 49 % (ref 43–80)
NEUTS SEG NFR BLD: 3.92 K/UL (ref 1.8–7.3)
PLATELET # BLD AUTO: 262 K/UL (ref 130–450)
PMV BLD AUTO: 9.8 FL (ref 7–12)
POTASSIUM SERPL-SCNC: 4.6 MMOL/L (ref 3.5–5)
PROT SERPL-MCNC: 7.2 G/DL (ref 6.4–8.3)
RBC # BLD AUTO: 4.78 M/UL (ref 3.8–5.8)
SODIUM SERPL-SCNC: 133 MMOL/L (ref 132–146)
WBC OTHER # BLD: 7.9 K/UL (ref 4.5–11.5)

## 2025-03-20 PROCEDURE — 80053 COMPREHEN METABOLIC PANEL: CPT

## 2025-03-20 PROCEDURE — 85025 COMPLETE CBC W/AUTO DIFF WBC: CPT

## 2025-03-20 PROCEDURE — 83690 ASSAY OF LIPASE: CPT

## 2025-03-20 PROCEDURE — 99285 EMERGENCY DEPT VISIT HI MDM: CPT

## 2025-03-20 PROCEDURE — 74177 CT ABD & PELVIS W/CONTRAST: CPT

## 2025-03-20 PROCEDURE — 83605 ASSAY OF LACTIC ACID: CPT

## 2025-03-20 PROCEDURE — 6360000004 HC RX CONTRAST MEDICATION: Performed by: RADIOLOGY

## 2025-03-20 RX ORDER — IOPAMIDOL 755 MG/ML
75 INJECTION, SOLUTION INTRAVASCULAR
Status: COMPLETED | OUTPATIENT
Start: 2025-03-20 | End: 2025-03-20

## 2025-03-20 RX ADMIN — IOPAMIDOL 75 ML: 755 INJECTION, SOLUTION INTRAVENOUS at 19:16

## 2025-03-20 ASSESSMENT — PAIN SCALES - GENERAL: PAINLEVEL_OUTOF10: 3

## 2025-03-20 ASSESSMENT — PAIN DESCRIPTION - FREQUENCY: FREQUENCY: CONTINUOUS

## 2025-03-20 ASSESSMENT — LIFESTYLE VARIABLES
HOW MANY STANDARD DRINKS CONTAINING ALCOHOL DO YOU HAVE ON A TYPICAL DAY: PATIENT DOES NOT DRINK
HOW OFTEN DO YOU HAVE A DRINK CONTAINING ALCOHOL: NEVER

## 2025-03-20 ASSESSMENT — PAIN DESCRIPTION - LOCATION: LOCATION: ABDOMEN

## 2025-03-20 ASSESSMENT — PAIN - FUNCTIONAL ASSESSMENT: PAIN_FUNCTIONAL_ASSESSMENT: 0-10

## 2025-03-20 ASSESSMENT — PAIN DESCRIPTION - DESCRIPTORS: DESCRIPTORS: DISCOMFORT

## 2025-03-20 ASSESSMENT — PAIN DESCRIPTION - ONSET: ONSET: ON-GOING

## 2025-03-20 ASSESSMENT — PAIN DESCRIPTION - PAIN TYPE: TYPE: ACUTE PAIN

## 2025-03-20 NOTE — ED PROVIDER NOTES
Decision To Discharge 03/20/2025 08:43:12 PM      PATIENT REFERRED TO:  Agus Das MD  [address]  [place] [address]  [phone]    Schedule an appointment as soon as possible for a visit   for hospital follow up      DISCHARGE MEDICATIONS:  Discharge Medication List as of 3/20/2025  8:44 PM          DISCONTINUED MEDICATIONS:  Discharge Medication List as of 3/20/2025  8:44 PM                 (Please note that portions of this note were completed with a voice recognition program.  Efforts were made to edit the dictations but occasionally words are mis-transcribed.)    Clarence Varghese II, DO (electronically signed)

## 2025-04-29 ENCOUNTER — HOSPITAL ENCOUNTER (OUTPATIENT)
Age: 65
Discharge: HOME | End: 2025-05-01

## 2025-05-06 LAB — SURGICAL PATHOLOGY REPORT: NORMAL

## (undated) DEVICE — SURGICAL PROCEDURE PACK HND

## (undated) DEVICE — PENCIL ES CRD L10FT HND SWCHING ROCK SWCH W/ EDGE COAT BLDE

## (undated) DEVICE — ZIMMER® STERILE DISPOSABLE TOURNIQUET CUFF WITH PLC, DUAL PORT, SINGLE BLADDER, 18 IN. (46 CM)

## (undated) DEVICE — GLOVE SURG SZ 85 L12IN FNGR ORTHO 126MIL CRM LTX FREE

## (undated) DEVICE — PAD,ABDOMINAL,5"X9",ST,LF,25/BX: Brand: MEDLINE INDUSTRIES, INC.

## (undated) DEVICE — GLOVE ORANGE PI 8 1/2   MSG9085

## (undated) DEVICE — ELECTRODE PT RET AD L9FT HI MOIST COND ADH HYDRGEL CORDED

## (undated) DEVICE — PADDING,UNDERCAST,COTTON, 4"X4YD STERILE: Brand: MEDLINE

## (undated) DEVICE — BNDG,ELSTC,MATRIX,STRL,4"X5YD,LF,HOOK&LP: Brand: MEDLINE

## (undated) DEVICE — DOUBLE BASIN SET: Brand: MEDLINE INDUSTRIES, INC.